# Patient Record
Sex: FEMALE | Race: WHITE | Employment: FULL TIME | URBAN - METROPOLITAN AREA
[De-identification: names, ages, dates, MRNs, and addresses within clinical notes are randomized per-mention and may not be internally consistent; named-entity substitution may affect disease eponyms.]

---

## 2017-03-18 PROBLEM — R82.71 GBS BACTERIURIA: Status: ACTIVE | Noted: 2017-03-18

## 2017-03-18 PROBLEM — E05.90 SUBCLINICAL HYPERTHYROIDISM: Status: ACTIVE | Noted: 2017-03-18

## 2017-03-21 ENCOUNTER — HOSPITAL ENCOUNTER (OUTPATIENT)
Dept: NON INVASIVE DIAGNOSTICS | Age: 27
Discharge: HOME | End: 2017-03-21
Attending: OBSTETRICS & GYNECOLOGY
Payer: COMMERCIAL

## 2017-03-21 DIAGNOSIS — I10 ESSENTIAL HYPERTENSION: ICD-10-CM

## 2017-03-21 DIAGNOSIS — Z3A.01 7 WEEKS GESTATION OF PREGNANCY: ICD-10-CM

## 2017-03-21 LAB
ATRIAL RATE: 80 BPM
CALCULATED P AXIS, ECG09: 24 DEGREES
CALCULATED R AXIS, ECG10: 82 DEGREES
CALCULATED T AXIS, ECG11: 55 DEGREES
DIAGNOSIS, 93000: NORMAL
DIASTOLIC BP, ECG02: NORMAL MMHG
P-R INTERVAL, ECG05: 132 MS
Q-T INTERVAL, ECG07: 372 MS
QRS DURATION, ECG06: 84 MS
QTC CALCULATION (BEZET), ECG08: 429 MS
SYSTOLIC BP, ECG01: NORMAL MMHG
VENTRICULAR RATE, ECG03: 80 BPM

## 2017-03-21 PROCEDURE — 93005 ELECTROCARDIOGRAM TRACING: CPT

## 2017-05-19 PROBLEM — O09.90 SUPERVISION OF HIGH RISK PREGNANCY, ANTEPARTUM: Status: ACTIVE | Noted: 2017-05-19

## 2017-06-03 ENCOUNTER — HOSPITAL ENCOUNTER (EMERGENCY)
Age: 27
Discharge: HOME | End: 2017-06-03
Attending: OBSTETRICS & GYNECOLOGY | Admitting: OBSTETRICS & GYNECOLOGY
Payer: COMMERCIAL

## 2017-06-03 VITALS
DIASTOLIC BLOOD PRESSURE: 82 MMHG | SYSTOLIC BLOOD PRESSURE: 161 MMHG | HEART RATE: 69 BPM | TEMPERATURE: 98.3 F | WEIGHT: 159 LBS | BODY MASS INDEX: 28.17 KG/M2 | HEIGHT: 63 IN | RESPIRATION RATE: 20 BRPM

## 2017-06-03 PROCEDURE — 99283 EMERGENCY DEPT VISIT LOW MDM: CPT

## 2017-06-03 NOTE — PROGRESS NOTES
Pt to room OBER1 for triage with chief complaint of Back Pain with pedal numbness. . Assessment begins, EFM and [place] applied and tracing well.

## 2017-06-03 NOTE — IP AVS SNAPSHOT
Myles Hernández 
 
 
 [address] [id number] [facility] 
[phone] Patient: Glenn Gordon MRN: [id number] :1990 You are allergic to the following No active allergies Recent Documentation Height Weight BMI OB Status Smoking Status 1.6 m 72.1 kg 28.17 kg/m2 Pregnant Former Smoker Emergency Contacts Name Discharge Info Relation Home Work Mobile [address] [facility] CAREGIVER [3] Spouse [3] 21 [phone] Lida Rice  Mother [14] [phone] About your hospitalization You were admitted on:  N/A You last received care in the:  [facility] You were discharged on:  Jeni 3, 2017 Unit phone number:  [phone] Why you were hospitalized Your primary diagnosis was:  Not on File Providers Seen During Your Hospitalizations Provider Role Specialty Primary office phone Helen Cronin MD Attending Provider Obstetrics & Gynecology [phone] Your Primary Care Physician (PCP) Primary Care Physician Office Phone Office Fax Lynda Lennon [phone] Follow-up Information None Your Appointments 2017  9:00 AM EDT Ultrasound plus physician visit with Lulú Hernandez 89 [facility] [address] [address] [phone] 2017  9:45 AM EDT Ultrasound plus physician visit with MD Molly Dawson ([facility]) [address] [address] [phone] Current Discharge Medication List  
  
ASK your doctor about these medications Dose & Instructions Dispensing Information Comments Morning Noon Evening Bedtime  
 aspirin delayed-release 81 mg tablet Your last dose was: Your next dose is: Dose:  81 mg Take 1 Tab by mouth daily. Quantity:  30 Tab Refills:  11  
     
   
   
   
  
 doxylamine-pyridoxine 10-10 mg Tbec Commonly known as:  Flordia Pellant Your last dose was: Your next dose is:    
   
   
 Dose:  2 Tab Take 2 Tabs by mouth nightly. May add 1 tab in morning and 1 tab at noon if needed Quantity:  120 Tab Refills:  5  
     
   
   
   
  
 labetalol 200 mg tablet Commonly known as:  Jerry Dowse Your last dose was: Your next dose is:    
   
   
 Dose:  400 mg Take 2 Tabs by mouth two (2) times a day. Indications: hypertension Quantity:  120 Tab Refills:  5 -iron-FA-om-3s-dha-epa 3.33 mg iron- 0.33 mg Rajni Rumble Commonly known as:  Emiliano Oas Your last dose was: Your next dose is:    
   
   
 Dose:  3 Tab Take 3 Tabs by mouth daily. Quantity:  90 Tab Refills:  11 Discharge Instructions Weeks 18 to 22 of Your Pregnancy: Care Instructions Your Care Instructions Your baby is continuing to develop quickly. At this stage, babies can now suck their thumbs,  firmly with their hands, and open and close their eyelids. Sometime between 18 and 22 weeks, you will start to feel your baby move. At first, these small fetal movements feel like fluttering or \"butterflies. \" Some women say that they feel like gas bubbles. As the baby grows, these movements will become stronger. You may also notice that your baby kicks and hiccups. During this time, you may find that your nausea and fatigue are gone. Overall, you may feel better and have more energy than you did in your first trimester. But you may also have new discomforts now, such as sleep problems or leg cramps. This care sheet can help you ease these discomforts. Follow-up care is a key part of your treatment and safety.  Be sure to make and go to all appointments, and call your doctor if you are having problems. It's also a good idea to know your test results and keep a list of the medicines you take. How can you care for yourself at home? Ease sleep problems · Avoid caffeine in drinks or chocolate late in the day. · Get some exercise every day. · Take a warm shower or bath before bed. · Have a light snack or glass of milk at bedtime. · Do relaxation exercises in bed to calm your mind and body. · Support your legs and back with extra pillows. Try a pillow between your legs if you sleep on your side. · Do not use sleeping pills or alcohol. They could harm your baby. Ease leg cramps · Do not massage your calf during the cramp. · Sit on a firm bed or chair. Straighten your leg, and bend your foot (flex your ankle) slowly upward, toward your knee. Bend your toes up and down. · Stand on a cool, flat surface. Stretch your toes upward, and take small steps walking on your heels. · Use a heating pad or hot water bottle to help with muscle ache. Prevent leg cramps · Be sure to get enough calcium. If you are worried that you are not getting enough, talk to your doctor. · Exercise every day, and stretch your legs before bed. · Take a warm bath before bed, and try leg warmers at night. Back Pain: Care Instructions Your Care Instructions Back pain has many possible causes. It is often related to problems with muscles and ligaments of the back. It may also be related to problems with the nerves, discs, or bones of the back. Moving, lifting, standing, sitting, or sleeping in an awkward way can strain the back. Sometimes you don't notice the injury until later. Arthritis is another common cause of back pain. Although it may hurt a lot, back pain usually improves on its own within several weeks. Most people recover in 12 weeks or less. Using good home treatment and being careful not to stress your back can help you feel better sooner. Follow-up care is a key part of your treatment and safety. Be sure to make and go to all appointments, and call your doctor if you are having problems. Its also a good idea to know your test results and keep a list of the medicines you take. How can you care for yourself at home? Sit or lie in positions that are most comfortable and reduce your pain. Try one of these positions when you lie down: Lie on your back with your knees bent and supported by large pillows. Lie on the floor with your legs on the seat of a sofa or chair. Lie on your side with your knees and hips bent and a pillow between your legs. Lie on your stomach if it does not make pain worse. Do not sit up in bed, and avoid soft couches and twisted positions. Bed rest can help relieve pain at first, but it delays healing. Avoid bed rest after the first day of back pain. Change positions every 30 minutes. If you must sit for long periods of time, take breaks from sitting. Get up and walk around, or lie in a comfortable position. Try using a heating pad on a low or medium setting for 15 to 20 minutes every 2 or 3 hours. Try a warm shower in place of one session with the heating pad. You can also try an ice pack for 10 to 15 minutes every 2 to 3 hours. Put a thin cloth between the ice pack and your skin. Take pain medicines exactly as directed. If the doctor gave you a prescription medicine for pain, take it as prescribed. If you are not taking a prescription pain medicine, ask your doctor if you can take an over-the-counter medicine. Take short walks several times a day. You can start with 5 to 10 minutes, 3 or 4 times a day, and work up to longer walks. Walk on level surfaces and avoid hills and stairs until your back is better. Return to work and other activities as soon as you can. Continued rest without activity is usually not good for your back.  
To prevent future back pain, do exercises to stretch and strengthen your back and stomach. Learn how to use good posture, safe lifting techniques, and proper body mechanics. When should you call for help? Call your doctor now or seek immediate medical care if: 
You have new or worsening numbness in your legs. You have new or worsening weakness in your legs. (This could make it hard to stand up.) You lose control of your bladder or bowels. Watch closely for changes in your health, and be sure to contact your doctor if: 
Your pain gets worse. You are not getting better after 2 weeks. Where can you learn more? Go to http://Pyxis Technology/. Enter J951 in the search box to learn more about \"Back Pain: Care Instructions. \" Current as of: May 23, 2016 Content Version: 11.2 © 0679-8485 [employer]. Care instructions adapted under license by Laser[employer] (which disclaims liability or warranty for this information). If you have questions about a medical condition or this instruction, always ask your healthcare professional. [address] any warranty or liability for your use of this information. · Where can you learn more? Go to http://Pyxis Technology/. Enter W477 in the search box to learn more about \"Weeks 18 to 22 of Your Pregnancy: Care Instructions. \" Current as of: May 30, 2016 Content Version: 11.2 © 5784-4387 [employer]. Care instructions adapted under license by [employer] (which disclaims liability or warranty for this information). If you have questions about a medical condition or this instruction, always ask your healthcare professional. [address] any warranty or liability for your use of this information. Discharge Orders None Introducing [facility]! Dear [facility]: Thank you for requesting a [employer] account. Our records indicate that you already have an active [employer] account.   You can access your account anytime at [email or web address] Systems/Step On Up Graphic[employer] Did you know that you can access your hospital and ER discharge instructions at any time in [employer]? You can also review all of your test results from your hospital stay or ER visit. Additional Information If you have questions, please visit the Frequently Asked Questions section of the [employer] website at [email or web address] [email or web address] Remember, [employer] is NOT to be used for urgent needs. For medical emergencies, dial 911. Now available from your iPhone and Android! General Information Please provide this summary of care documentation to your next provider. Patient Signature:  ____________________________________________________________ Date:  ____________________________________________________________  
  
Renard Neil Provider Signature:  ____________________________________________________________ Date:  ____________________________________________________________

## 2017-06-03 NOTE — IP AVS SNAPSHOT
Current Discharge Medication List  
  
ASK your doctor about these medications Dose & Instructions Dispensing Information Comments Morning Noon Evening Bedtime  
 aspirin delayed-release 81 mg tablet Your last dose was: Your next dose is:    
   
   
 Dose:  81 mg Take 1 Tab by mouth daily. Quantity:  30 Tab Refills:  11  
     
   
   
   
  
 doxylamine-pyridoxine 10-10 mg Tbec Commonly known as:  Melian Castaneda Your last dose was: Your next dose is:    
   
   
 Dose:  2 Tab Take 2 Tabs by mouth nightly. May add 1 tab in morning and 1 tab at noon if needed Quantity:  120 Tab Refills:  5  
     
   
   
   
  
 labetalol 200 mg tablet Commonly known as:  Robin Wilson Your last dose was: Your next dose is:    
   
   
 Dose:  400 mg Take 2 Tabs by mouth two (2) times a day. Indications: hypertension Quantity:  120 Tab Refills:  5 -iron-FA-om-3s-dha-epa 3.33 mg iron- 0.33 mg Erick Jackson Commonly known as:  Juan Eugene Your last dose was: Your next dose is:    
   
   
 Dose:  3 Tab Take 3 Tabs by mouth daily. Quantity:  90 Tab Refills:  11

## 2017-06-03 NOTE — DISCHARGE INSTRUCTIONS
Weeks 18 to 22 of Your Pregnancy: Care Instructions  Your Care Instructions    Your baby is continuing to develop quickly. At this stage, babies can now suck their thumbs,  firmly with their hands, and open and close their eyelids. Sometime between 18 and 22 weeks, you will start to feel your baby move. At first, these small fetal movements feel like fluttering or \"butterflies. \" Some women say that they feel like gas bubbles. As the baby grows, these movements will become stronger. You may also notice that your baby kicks and hiccups. During this time, you may find that your nausea and fatigue are gone. Overall, you may feel better and have more energy than you did in your first trimester. But you may also have new discomforts now, such as sleep problems or leg cramps. This care sheet can help you ease these discomforts. Follow-up care is a key part of your treatment and safety. Be sure to make and go to all appointments, and call your doctor if you are having problems. It's also a good idea to know your test results and keep a list of the medicines you take. How can you care for yourself at home? Ease sleep problems  · Avoid caffeine in drinks or chocolate late in the day. · Get some exercise every day. · Take a warm shower or bath before bed. · Have a light snack or glass of milk at bedtime. · Do relaxation exercises in bed to calm your mind and body. · Support your legs and back with extra pillows. Try a pillow between your legs if you sleep on your side. · Do not use sleeping pills or alcohol. They could harm your baby. Ease leg cramps  · Do not massage your calf during the cramp. · Sit on a firm bed or chair. Straighten your leg, and bend your foot (flex your ankle) slowly upward, toward your knee. Bend your toes up and down. · Stand on a cool, flat surface. Stretch your toes upward, and take small steps walking on your heels.   · Use a heating pad or hot water bottle to help with muscle ache.  Prevent leg cramps  · Be sure to get enough calcium. If you are worried that you are not getting enough, talk to your doctor. · Exercise every day, and stretch your legs before bed. · Take a warm bath before bed, and try leg warmers at night. Back Pain: Care Instructions  Your Care Instructions    Back pain has many possible causes. It is often related to problems with muscles and ligaments of the back. It may also be related to problems with the nerves, discs, or bones of the back. Moving, lifting, standing, sitting, or sleeping in an awkward way can strain the back. Sometimes you don't notice the injury until later. Arthritis is another common cause of back pain. Although it may hurt a lot, back pain usually improves on its own within several weeks. Most people recover in 12 weeks or less. Using good home treatment and being careful not to stress your back can help you feel better sooner. Follow-up care is a key part of your treatment and safety. Be sure to make and go to all appointments, and call your doctor if you are having problems. Its also a good idea to know your test results and keep a list of the medicines you take. How can you care for yourself at home? Sit or lie in positions that are most comfortable and reduce your pain. Try one of these positions when you lie down:  Lie on your back with your knees bent and supported by large pillows. Lie on the floor with your legs on the seat of a sofa or chair. Lie on your side with your knees and hips bent and a pillow between your legs. Lie on your stomach if it does not make pain worse. Do not sit up in bed, and avoid soft couches and twisted positions. Bed rest can help relieve pain at first, but it delays healing. Avoid bed rest after the first day of back pain. Change positions every 30 minutes. If you must sit for long periods of time, take breaks from sitting. Get up and walk around, or lie in a comfortable position.   Try using a heating pad on a low or medium setting for 15 to 20 minutes every 2 or 3 hours. Try a warm shower in place of one session with the heating pad. You can also try an ice pack for 10 to 15 minutes every 2 to 3 hours. Put a thin cloth between the ice pack and your skin. Take pain medicines exactly as directed. If the doctor gave you a prescription medicine for pain, take it as prescribed. If you are not taking a prescription pain medicine, ask your doctor if you can take an over-the-counter medicine. Take short walks several times a day. You can start with 5 to 10 minutes, 3 or 4 times a day, and work up to longer walks. Walk on level surfaces and avoid hills and stairs until your back is better. Return to work and other activities as soon as you can. Continued rest without activity is usually not good for your back. To prevent future back pain, do exercises to stretch and strengthen your back and stomach. Learn how to use good posture, safe lifting techniques, and proper body mechanics. When should you call for help? Call your doctor now or seek immediate medical care if:  You have new or worsening numbness in your legs. You have new or worsening weakness in your legs. (This could make it hard to stand up.)  You lose control of your bladder or bowels. Watch closely for changes in your health, and be sure to contact your doctor if:  Your pain gets worse. You are not getting better after 2 weeks. Where can you learn more? Go to http://micha-mahi.info/. Enter P828 in the search box to learn more about \"Back Pain: Care Instructions. \"  Current as of: May 23, 2016  Content Version: 11.2  © 7197-0245 [employer]. Care instructions adapted under license by Basic-[employer] (which disclaims liability or warranty for this information).  If you have questions about a medical condition or this instruction, always ask your healthcare professional. [employer] disclaims any warranty or liability for your use of this information. ·   Where can you learn more? Go to http://micha-mahi.info/. Enter H312 in the search box to learn more about \"Weeks 18 to 22 of Your Pregnancy: Care Instructions. \"  Current as of: May 30, 2016  Content Version: 11.2  © 3552-5778 [employer]. Care instructions adapted under license by [employer] (which disclaims liability or warranty for this information). If you have questions about a medical condition or this instruction, always ask your healthcare professional. [address] [address] any warranty or liability for your use of this information.

## 2017-06-03 NOTE — PROGRESS NOTES
06/03/17 1718   Cervical Exam   Dilation (cm) 0   Eff 0 %   SVE per Dr Jonah De. Talked to pt about talking to [facility] about PT, and she could maybe start wearing a maternity belt. Talked to pt about pedal numbness . .explaned may be due to edema.

## 2017-06-03 NOTE — H&P
Chief Complaint   Patient presents with    Back Pain       27 y.o. female at 19w2d  weeks gestation who is seen for sharp intermittent low back pain. Somewhat aggravated by activity and relieved by rest. Occasionally feels it radiates to lower abdomen. No vaginal bleeding, new discharge, new urinary frequency or urgency. 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 gbs positive urine earlier in pregnancy. HISTORY:    History   Sexual Activity    Sexual activity: Yes    Partners: Male    Birth control/ protection: None     Patient's last menstrual period was 01/19/2017. Social History     Social History    Marital status:      Spouse name: N/A    Number of children: N/A    Years of education: N/A     Occupational History    Not on file. Social History Main Topics    Smoking status: Former Smoker     Packs/day: 0.20     Years: 2.00    Smokeless tobacco: Current User      Comment: vapes occ     Alcohol use No    Drug use: No    Sexual activity: Yes     Partners: Male     Birth control/ protection: None     Other Topics Concern    Not on file     Social History Narrative       History reviewed. No pertinent surgical history. Past Medical History:   Diagnosis Date    Allergic rhinitis 1/17/2014    Depression 1/17/2014    HTN (hypertension) 1/17/2014    Nicotine vapor product user     Unspecified hypothyroidism 1/17/2014         ROS:  Negative:   negative 10 point ROS except as noted in HPI    Positive:   per hpi    PHYSICAL EXAM:  Blood pressure 161/82, pulse 69, temperature 98.3 °F (36.8 °C), resp. rate 20, height 5' 3\" (1.6 m), weight 72.1 kg (159 lb), last menstrual period 01/19/2017. The patient appears well, alert, oriented x 3. Appropriate affect. Lungs are clear. Heart RRR, no murmurs. Abdomen soft, non-tender, no rebound/guarding.   Fundus soft and non tender  Skin warm, dry, no rashes  Ext  No edema, DTR's normal. ttp over both SI joints    Cervix: Dilation: 0 cm, Effacement: [address]: -3.    Fetal Heart Rate: present      No results found for this or any previous visit (from the past 24 hour(s)). Assessment:  27 y.o. female at 25w3d. with likely sacroilitis. Plan: discussed mgmt with tylenol, heat, stretches, and exercises. Could talk to her primary about PT. Recommend maternity support band. Charleen Dougherty MD

## 2017-06-14 ENCOUNTER — HOSPITAL ENCOUNTER (EMERGENCY)
Age: 27
Discharge: HOME | End: 2017-06-14
Attending: OBSTETRICS & GYNECOLOGY | Admitting: OBSTETRICS & GYNECOLOGY
Payer: COMMERCIAL

## 2017-06-14 VITALS
TEMPERATURE: 98.3 F | HEART RATE: 57 BPM | SYSTOLIC BLOOD PRESSURE: 152 MMHG | DIASTOLIC BLOOD PRESSURE: 81 MMHG | RESPIRATION RATE: 20 BRPM

## 2017-06-14 LAB
ALBUMIN SERPL BCP-MCNC: 3.1 G/DL (ref 3.5–5)
ALBUMIN/GLOB SERPL: 0.9 {RATIO} (ref 1.2–3.5)
ALP SERPL-CCNC: 53 U/L (ref 50–136)
ALT SERPL-CCNC: 16 U/L (ref 12–65)
ANION GAP BLD CALC-SCNC: 10 MMOL/L (ref 7–16)
APPEARANCE UR: ABNORMAL
AST SERPL W P-5'-P-CCNC: 15 U/L (ref 15–37)
BACTERIA URNS QL MICRO: ABNORMAL /HPF
BASOPHILS # BLD AUTO: 0 K/UL (ref 0–0.2)
BASOPHILS # BLD: 0 % (ref 0–2)
BILIRUB SERPL-MCNC: 0.4 MG/DL (ref 0.2–1.1)
BILIRUB UR QL: NEGATIVE
BUN SERPL-MCNC: 6 MG/DL (ref 6–23)
CALCIUM SERPL-MCNC: 9 MG/DL (ref 8.3–10.4)
CHLORIDE SERPL-SCNC: 106 MMOL/L (ref 98–107)
CO2 SERPL-SCNC: 25 MMOL/L (ref 21–32)
COLOR UR: YELLOW
CREAT SERPL-MCNC: 0.56 MG/DL (ref 0.6–1)
DIFFERENTIAL METHOD BLD: ABNORMAL
EOSINOPHIL # BLD: 0.1 K/UL (ref 0–0.8)
EOSINOPHIL NFR BLD: 1 % (ref 0.5–7.8)
EPI CELLS #/AREA URNS HPF: ABNORMAL /HPF
ERYTHROCYTE [DISTWIDTH] IN BLOOD BY AUTOMATED COUNT: 12.6 % (ref 11.9–14.6)
GLOBULIN SER CALC-MCNC: 3.4 G/DL (ref 2.3–3.5)
GLUCOSE SERPL-MCNC: 88 MG/DL (ref 65–100)
GLUCOSE UR STRIP.AUTO-MCNC: NEGATIVE MG/DL
HCT VFR BLD AUTO: 34 % (ref 35.8–46.3)
HGB BLD-MCNC: 11.8 G/DL (ref 11.7–15.4)
HGB UR QL STRIP: NEGATIVE
IMM GRANULOCYTES # BLD: 0 K/UL (ref 0–0.5)
IMM GRANULOCYTES NFR BLD AUTO: 0.3 % (ref 0–5)
KETONES UR QL STRIP.AUTO: NEGATIVE MG/DL
LEUKOCYTE ESTERASE UR QL STRIP.AUTO: ABNORMAL
LYMPHOCYTES # BLD AUTO: 18 % (ref 13–44)
LYMPHOCYTES # BLD: 1.8 K/UL (ref 0.5–4.6)
MCH RBC QN AUTO: 29.9 PG (ref 26.1–32.9)
MCHC RBC AUTO-ENTMCNC: 34.7 G/DL (ref 31.4–35)
MCV RBC AUTO: 86.3 FL (ref 79.6–97.8)
MONOCYTES # BLD: 0.8 K/UL (ref 0.1–1.3)
MONOCYTES NFR BLD AUTO: 7 % (ref 4–12)
NEUTS SEG # BLD: 7.6 K/UL (ref 1.7–8.2)
NEUTS SEG NFR BLD AUTO: 74 % (ref 43–78)
NITRITE UR QL STRIP.AUTO: NEGATIVE
OTHER OBSERVATIONS,UCOM: ABNORMAL
PH UR STRIP: 7 [PH] (ref 5–9)
PLATELET # BLD AUTO: 250 K/UL (ref 150–450)
PMV BLD AUTO: 10.7 FL (ref 10.8–14.1)
POTASSIUM SERPL-SCNC: 3.3 MMOL/L (ref 3.5–5.1)
PROT SERPL-MCNC: 6.5 G/DL (ref 6.3–8.2)
PROT UR STRIP-MCNC: NEGATIVE MG/DL
RBC # BLD AUTO: 3.94 M/UL (ref 4.05–5.25)
RBC #/AREA URNS HPF: ABNORMAL /HPF
SODIUM SERPL-SCNC: 141 MMOL/L (ref 136–145)
SP GR UR REFRACTOMETRY: 1.01 (ref 1–1.02)
UROBILINOGEN UR QL STRIP.AUTO: 0.2 EU/DL (ref 0.2–1)
WBC # BLD AUTO: 10.4 K/UL (ref 4.3–11.1)
WBC URNS QL MICRO: ABNORMAL /HPF

## 2017-06-14 PROCEDURE — 96374 THER/PROPH/DIAG INJ IV PUSH: CPT

## 2017-06-14 PROCEDURE — 80053 COMPREHEN METABOLIC PANEL: CPT | Performed by: OBSTETRICS & GYNECOLOGY

## 2017-06-14 PROCEDURE — 99283 EMERGENCY DEPT VISIT LOW MDM: CPT

## 2017-06-14 PROCEDURE — 81001 URINALYSIS AUTO W/SCOPE: CPT | Performed by: OBSTETRICS & GYNECOLOGY

## 2017-06-14 PROCEDURE — 74011250636 HC RX REV CODE- 250/636: Performed by: OBSTETRICS & GYNECOLOGY

## 2017-06-14 PROCEDURE — 96360 HYDRATION IV INFUSION INIT: CPT

## 2017-06-14 PROCEDURE — 85025 COMPLETE CBC W/AUTO DIFF WBC: CPT | Performed by: OBSTETRICS & GYNECOLOGY

## 2017-06-14 PROCEDURE — 96375 TX/PRO/DX INJ NEW DRUG ADDON: CPT

## 2017-06-14 RX ORDER — ONDANSETRON 4 MG/1
4 TABLET, ORALLY DISINTEGRATING ORAL
Qty: 10 TAB | Refills: 0 | Status: ON HOLD | OUTPATIENT
Start: 2017-06-14 | End: 2017-07-18

## 2017-06-14 RX ORDER — ONDANSETRON 2 MG/ML
4 INJECTION INTRAMUSCULAR; INTRAVENOUS ONCE
Status: COMPLETED | OUTPATIENT
Start: 2017-06-14 | End: 2017-06-14

## 2017-06-14 RX ORDER — SODIUM CHLORIDE, SODIUM LACTATE, POTASSIUM CHLORIDE, CALCIUM CHLORIDE 600; 310; 30; 20 MG/100ML; MG/100ML; MG/100ML; MG/100ML
500 INJECTION, SOLUTION INTRAVENOUS CONTINUOUS
Status: DISCONTINUED | OUTPATIENT
Start: 2017-06-14 | End: 2017-06-14 | Stop reason: HOSPADM

## 2017-06-14 RX ADMIN — ONDANSETRON 4 MG: 2 INJECTION INTRAMUSCULAR; INTRAVENOUS at 19:19

## 2017-06-14 RX ADMIN — SODIUM CHLORIDE, SODIUM LACTATE, POTASSIUM CHLORIDE, AND CALCIUM CHLORIDE 500 ML/HR: 600; 310; 30; 20 INJECTION, SOLUTION INTRAVENOUS at 19:10

## 2017-06-14 NOTE — PROGRESS NOTES
[address] June 14, 2017       RE: Prashant Downey      To Whom It May Concern,    This is to certify that Prashant Downey may return to work on 6/16/17. Please feel free to contact my office if you have any questions or concerns. Thank you for your assistance in this matter.       Sincerely,  Delton Nageotte, RN

## 2017-06-14 NOTE — IP AVS SNAPSHOT
Summary of Care Report The Summary of Care report has been created to help improve care coordination. Users with access to [employer] or [employer] [address] (Web-based application) may access additional patient information including the Discharge Summary. If you are not currently a [address] user and need more information, please call the number listed below in the [place] [address] section and ask to be connected with Medical Records. Facility Information Name Address Phone [id number] [place] [address] [address] [place] [address] [phone] Patient Information Patient Name Sex SHAMA Barry ([id number]) Female 1990 Discharge Information Admitting Provider Service Area Unit Jose Alvares MD / [address] [facility] 4 Antepartum / [phone] Discharge Provider Discharge Date/Time Discharge Disposition Destination (none) (none) (none) (none) Patient Language Language ENGLISH [13] Hospital Problems as of 2017  Reviewed: 6/3/2017  5:11 PM by Sherie Retana MD  
 None Non-Hospital Problems as of 2017  Reviewed: 6/3/2017  5:11 PM by Sherie Retana MD  
  
  
  
 Class Noted - Resolved Last Modified Active Problems HTN (hypertension)  2014 - Present 2017 by Robyn Zuleta MD  
  Entered by [facility] Overview Addendum 2017 10:10 AM by Robyn Zuleta MD  
   Labetalol 200mg BID on intake Dx at age 16yo; reports renal imaging neg Will need serial growth US and  testing by 34wks Baseline HELLP labs wnl Baseline 24hr urine:  90 (3/29/17) Baseline EKG wnl (3/21/17) ASA 81mg -->DC 36wks 5/15/17 (15wks) Increased to Lab 400 BID  but reports this made her nauseous so she changed to taking 200, waiting a few hours and taking the next 200mg 6/7/17: changed to Labetalol 200 TID Allergic rhinitis  1/17/2014 - Present 1/17/2014 by Darral Olszewski Entered by Darral Olszewski  
  Depression  1/17/2014 - Present 1/17/2014 by Darral Olszewski Entered by Darral Olszewski Dysmenorrhea  4/5/2016 - Present 4/5/2016 by Lg Laura Entered by Lg Laura Amenorrhea  4/5/2016 - Present 4/5/2016 by Lg Laura Entered by Lg Laura Hyperprolactinemia ([address]  4/14/2016 - Present 4/14/2016 by Nancie Cortez MD  
  Entered by Nancie Cortez MD  
  Subclinical hyperthyroidism  3/18/2017 - Present 3/18/2017 by Michael Lu MD  
  Entered by Michael Lu MD  
  Overview Signed 3/18/2017  3:30 PM by Michael Lu MD  
   No meds required GBS bacteriuria  3/18/2017 - Present 4/12/2017 by Michael Lu MD  
  Entered by Michael Lu MD  
  Overview Addendum 4/12/2017  8:14 AM by Michael Lu MD  
   +GBS UTI-->Rx for PCN (3/18); neg JESSICA 4/10/17 If gets 2nd UTI will need ppx for rest of pregnancy 
will need ppx in labor as well Supervision of high risk pregnancy  5/19/2017 - Present 5/19/2017 by Michael Lu MD  
  Entered by Michael Lu MD  
  Overview Signed 5/19/2017  9:08 AM by Michael Lu MD  
   Quad screen neg You are allergic to the following No active allergies Current Discharge Medication List  
  
ASK your doctor about these medications Dose & Instructions Dispensing Information Comments  
 aspirin delayed-release 81 mg tablet Dose:  81 mg Take 1 Tab by mouth daily. Quantity:  30 Tab Refills:  11  
   
 doxylamine-pyridoxine 10-10 mg Tbec Commonly known as:  Lionel Peck Dose:  2 Tab Take 2 Tabs by mouth nightly. May add 1 tab in morning and 1 tab at noon if needed Quantity:  120 Tab Refills:  5  
   
 labetalol 200 mg tablet Commonly known as:  Mendel Bevels  Dose:  200 mg  
 Take 1 Tab by mouth three (3) times daily. Indications: hypertension Quantity:  90 Tab Refills:  5 -iron-FA-om-3s-dha-epa 3.33 mg iron- 0.33 mg Ely Plana Commonly known as:  See Leahy Dose:  3 Tab Take 3 Tabs by mouth daily. Quantity:  90 Tab Refills:  11 Follow-up Information None Discharge Instructions Weeks 18 to 22 of Your Pregnancy: Care Instructions Your Care Instructions Your baby is continuing to develop quickly. At this stage, babies can now suck their thumbs,  firmly with their hands, and open and close their eyelids. Sometime between 18 and 22 weeks, you will start to feel your baby move. At first, these small fetal movements feel like fluttering or \"butterflies. \" Some women say that they feel like gas bubbles. As the baby grows, these movements will become stronger. You may also notice that your baby kicks and hiccups. During this time, you may find that your nausea and fatigue are gone. Overall, you may feel better and have more energy than you did in your first trimester. But you may also have new discomforts now, such as sleep problems or leg cramps. This care sheet can help you ease these discomforts. Follow-up care is a key part of your treatment and safety. Be sure to make and go to all appointments, and call your doctor if you are having problems. It's also a good idea to know your test results and keep a list of the medicines you take. How can you care for yourself at home? Ease sleep problems · Avoid caffeine in drinks or chocolate late in the day. · Get some exercise every day. · Take a warm shower or bath before bed. · Have a light snack or glass of milk at bedtime. · Do relaxation exercises in bed to calm your mind and body. · Support your legs and back with extra pillows. Try a pillow between your legs if you sleep on your side. · Do not use sleeping pills or alcohol. They could harm your baby. Ease leg cramps · Do not massage your calf during the cramp. · Sit on a firm bed or chair. Straighten your leg, and bend your foot (flex your ankle) slowly upward, toward your knee. Bend your toes up and down. · Stand on a cool, flat surface. Stretch your toes upward, and take small steps walking on your heels. · Use a heating pad or hot water bottle to help with muscle ache. Prevent leg cramps · Be sure to get enough calcium. If you are worried that you are not getting enough, talk to your doctor. · Exercise every day, and stretch your legs before bed. · Take a warm bath before bed, and try leg warmers at night. Where can you learn more? Go to http://micha-mahi.info/. Enter I266 in the search box to learn more about \"Weeks 18 to 22 of Your Pregnancy: Care Instructions. \" Current as of: May 30, 2016 Content Version: 11.2 © 9637-4824 [employer]. Care instructions adapted under license by [employer] (which disclaims liability or warranty for this information). If you have questions about a medical condition or this instruction, always ask your healthcare professional. [place] [address] any warranty or liability for your use of this information. Pregnancy Precautions: Care Instructions Your Care Instructions There is no sure way to prevent labor before your due date ( labor) or to prevent most other pregnancy problems. But there are things you can do to increase your chances of a healthy pregnancy. Go to your appointments, follow your doctor's advice, and take good care of yourself. Eat well, and exercise (if your doctor agrees). And make sure to drink plenty of water. Follow-up care is a key part of your treatment and safety. Be sure to make and go to all appointments, and call your doctor if you are having problems. It's also a good idea to know your test results and keep a list of the medicines you take. How can you care for yourself at home? · Make sure you go to your prenatal appointments. At each visit, your doctor will check your blood pressure. Your doctor will also check to see if you have protein in your urine. High blood pressure and protein in urine are signs of preeclampsia. This condition can be dangerous for you and your baby. · Drink plenty of fluids, enough so that your urine is light yellow or clear like water. Dehydration can cause contractions. If you have kidney, heart, or liver disease and have to limit fluids, talk with your doctor before you increase the amount of fluids you drink. · Tell your doctor right away if you notice any symptoms of an infection, such as: ¨ Burning when you urinate. ¨ A foul-smelling discharge from your vagina. ¨ Vaginal itching. ¨ Unexplained fever. ¨ Unusual pain or soreness in your uterus or lower belly. · Eat a balanced diet. Include plenty of foods that are high in calcium and iron. ¨ Foods high in calcium include milk, cheese, yogurt, almonds, and broccoli. ¨ Foods high in iron include red meat, shellfish, poultry, eggs, beans, raisins, whole-grain bread, and leafy green vegetables. · Do not smoke. If you need help quitting, talk to your doctor about stop-smoking programs and medicines. These can increase your chances of quitting for good. · Do not drink alcohol or use illegal drugs. · Follow your doctor's directions about activity. Your doctor will let you know how much, if any, exercise you can do. · Ask your doctor if you can have sex. If you are at risk for early labor, your doctor may ask you to not have sex. · Take care to prevent falls. During pregnancy, your joints are loose, and your balance is off. Sports such as bicycling, skiing, or in-line skating can increase your risk of falling. And don't ride horses or motorcycles, dive, water ski, scuba dive, or parachute jump while you are pregnant. · Avoid getting very hot. Do not use saunas or hot tubs. Avoid staying out in the sun in hot weather for long periods. Take acetaminophen (Tylenol) to lower a high fever. · Do not take any over-the-counter or herbal medicines or supplements without talking to your doctor or pharmacist first. 
When should you call for help? Call 911 anytime you think you may need emergency care. For example, call if: 
· You passed out (lost consciousness). · You have severe vaginal bleeding. · You have severe pain in your belly or pelvis. · You have had fluid gushing or leaking from your vagina and you know or think the umbilical cord is bulging into your vagina. If this happens, immediately get down on your knees so your rear end (buttocks) is higher than your head. This will decrease the pressure on the cord until help arrives. Call your doctor now or seek immediate medical care if: 
· You have signs of preeclampsia, such as: 
¨ Sudden swelling of your face, hands, or feet. ¨ New vision problems (such as dimness or blurring). ¨ A severe headache. · You have any vaginal bleeding. · You have belly pain or cramping. · You have a fever. · You have had regular contractions (with or without pain) for an hour. This means that you have 8 or more within 1 hour or 4 or more in 20 minutes after you change your position and drink fluids. · You have a sudden release of fluid from your vagina. · You have low back pain or pelvic pressure that does not go away. · You notice that your baby has stopped moving or is moving much less than normal. 
Watch closely for changes in your health, and be sure to contact your doctor if you have any problems. Where can you learn more? Go to http://micha-mahi.info/. Enter [id number] in the search box to learn more about \"Pregnancy Precautions: Care Instructions. \" Current as of: May 30, 2016 Content Version: 11.2 © 7533-5196 [employer]. Care instructions adapted under license by [employer] (which disclaims liability or warranty for this information). If you have questions about a medical condition or this instruction, always ask your healthcare professional. [place] [address] any warranty or liability for your use of this information. Chart Review Routing History Recipient Method Report Sent By Crissie Sever Craven Amabile, MD  
Phone: [phone] In Basket Note Review Ninfa Reynoso MD [[id number]] 4/5/2016 11:49 AM 04/05/2016

## 2017-06-14 NOTE — IP AVS SNAPSHOT
[address] 
[phone] Patient: Glenn Gordon MRN: [id number] :1990 You are allergic to the following No active allergies Recent Documentation OB Status Smoking Status Pregnant Former Smoker Emergency Contacts Name Discharge Info Relation Home Work Mobile [facility] CAREGIVER [3] Spouse [3] 21 [phone] Lida Rice  Mother [14] [phone] About your hospitalization You were admitted on:  N/A You last received care in the:  [facility] 4 ANTEPARTUM You were discharged on:  2017 Unit phone number:  [phone] Why you were hospitalized Your primary diagnosis was:  Not on File Providers Seen During Your Hospitalizations Provider Role Specialty Primary office phone Helen Cronin MD Attending Provider Obstetrics & Gynecology [phone] Your Primary Care Physician (PCP) Primary Care Physician Office Phone Office Fax Lynda Farazalonso [phone] Follow-up Information None Your Appointments 2017  9:15 AM EDT Return OB with MD [facility] [address] [place] [id number] [phone] Current Discharge Medication List  
  
ASK your doctor about these medications Dose & Instructions Dispensing Information Comments Morning Noon Evening Bedtime  
 aspirin delayed-release 81 mg tablet Your last dose was: Your next dose is:    
   
   
 Dose:  81 mg Take 1 Tab by mouth daily. Quantity:  30 Tab Refills:  11  
     
   
   
   
  
 doxylamine-pyridoxine 10-10 mg Tbec Commonly known as:  Melina Castaneda Your last dose was: Your next dose is:    
   
   
 Dose:  2 Tab Take 2 Tabs by mouth nightly. May add 1 tab in morning and 1 tab at noon if needed Quantity:  120 Tab Refills:  5  
     
   
   
   
  
 labetalol 200 mg tablet Commonly known as:  Jake Schneider Your last dose was: Your next dose is:    
   
   
 Dose:  200 mg Take 1 Tab by mouth three (3) times daily. Indications: hypertension Quantity:  90 Tab Refills:  5 -iron-FA-om-3s-dha-epa 3.33 mg iron- 0.33 mg Najma Bjornstad Commonly known as:  Haseeb Stiles Your last dose was: Your next dose is:    
   
   
 Dose:  3 Tab Take 3 Tabs by mouth daily. Quantity:  90 Tab Refills:  11 Discharge Instructions Weeks 18 to 22 of Your Pregnancy: Care Instructions Your Care Instructions Your baby is continuing to develop quickly. At this stage, babies can now suck their thumbs,  firmly with their hands, and open and close their eyelids. Sometime between 18 and 22 weeks, you will start to feel your baby move. At first, these small fetal movements feel like fluttering or \"butterflies. \" Some women say that they feel like gas bubbles. As the baby grows, these movements will become stronger. You may also notice that your baby kicks and hiccups. During this time, you may find that your nausea and fatigue are gone. Overall, you may feel better and have more energy than you did in your first trimester. But you may also have new discomforts now, such as sleep problems or leg cramps. This care sheet can help you ease these discomforts. Follow-up care is a key part of your treatment and safety. Be sure to make and go to all appointments, and call your doctor if you are having problems. It's also a good idea to know your test results and keep a list of the medicines you take. How can you care for yourself at home? Ease sleep problems · Avoid caffeine in drinks or chocolate late in the day. · Get some exercise every day. · Take a warm shower or bath before bed. · Have a light snack or glass of milk at bedtime. · Do relaxation exercises in bed to calm your mind and body. · Support your legs and back with extra pillows. Try a pillow between your legs if you sleep on your side. · Do not use sleeping pills or alcohol. They could harm your baby. Ease leg cramps · Do not massage your calf during the cramp. · Sit on a firm bed or chair. Straighten your leg, and bend your foot (flex your ankle) slowly upward, toward your knee. Bend your toes up and down. · Stand on a cool, flat surface. Stretch your toes upward, and take small steps walking on your heels. · Use a heating pad or hot water bottle to help with muscle ache. Prevent leg cramps · Be sure to get enough calcium. If you are worried that you are not getting enough, talk to your doctor. · Exercise every day, and stretch your legs before bed. · Take a warm bath before bed, and try leg warmers at night. Where can you learn more? Go to http://micha-mahi.info/. Enter O382 in the search box to learn more about \"Weeks 18 to 22 of Your Pregnancy: Care Instructions. \" Current as of: May 30, 2016 Content Version: 11.2 © 9825-8450 [employer]. Care instructions adapted under license by [employer] (which disclaims liability or warranty for this information). If you have questions about a medical condition or this instruction, always ask your healthcare professional. [place] [address] any warranty or liability for your use of this information. Pregnancy Precautions: Care Instructions Your Care Instructions There is no sure way to prevent labor before your due date ( labor) or to prevent most other pregnancy problems. But there are things you can do to increase your chances of a healthy pregnancy.  Go to your appointments, follow your doctor's advice, and take good care of yourself. Eat well, and exercise (if your doctor agrees). And make sure to drink plenty of water. Follow-up care is a key part of your treatment and safety. Be sure to make and go to all appointments, and call your doctor if you are having problems. It's also a good idea to know your test results and keep a list of the medicines you take. How can you care for yourself at home? · Make sure you go to your prenatal appointments. At each visit, your doctor will check your blood pressure. Your doctor will also check to see if you have protein in your urine. High blood pressure and protein in urine are signs of preeclampsia. This condition can be dangerous for you and your baby. · Drink plenty of fluids, enough so that your urine is light yellow or clear like water. Dehydration can cause contractions. If you have kidney, heart, or liver disease and have to limit fluids, talk with your doctor before you increase the amount of fluids you drink. · Tell your doctor right away if you notice any symptoms of an infection, such as: ¨ Burning when you urinate. ¨ A foul-smelling discharge from your vagina. ¨ Vaginal itching. ¨ Unexplained fever. ¨ Unusual pain or soreness in your uterus or lower belly. · Eat a balanced diet. Include plenty of foods that are high in calcium and iron. ¨ Foods high in calcium include milk, cheese, yogurt, almonds, and broccoli. ¨ Foods high in iron include red meat, shellfish, poultry, eggs, beans, raisins, whole-grain bread, and leafy green vegetables. · Do not smoke. If you need help quitting, talk to your doctor about stop-smoking programs and medicines. These can increase your chances of quitting for good. · Do not drink alcohol or use illegal drugs. · Follow your doctor's directions about activity. Your doctor will let you know how much, if any, exercise you can do. · Ask your doctor if you can have sex. If you are at risk for early labor, your doctor may ask you to not have sex. · Take care to prevent falls. During pregnancy, your joints are loose, and your balance is off. Sports such as bicycling, skiing, or in-line skating can increase your risk of falling. And don't ride horses or motorcycles, dive, water ski, scuba dive, or parachute jump while you are pregnant. · Avoid getting very hot. Do not use saunas or hot tubs. Avoid staying out in the sun in hot weather for long periods. Take acetaminophen (Tylenol) to lower a high fever. · Do not take any over-the-counter or herbal medicines or supplements without talking to your doctor or pharmacist first. 
When should you call for help? Call 911 anytime you think you may need emergency care. For example, call if: 
· You passed out (lost consciousness). · You have severe vaginal bleeding. · You have severe pain in your belly or pelvis. · You have had fluid gushing or leaking from your vagina and you know or think the umbilical cord is bulging into your vagina. If this happens, immediately get down on your knees so your rear end (buttocks) is higher than your head. This will decrease the pressure on the cord until help arrives. Call your doctor now or seek immediate medical care if: 
· You have signs of preeclampsia, such as: 
¨ Sudden swelling of your face, hands, or feet. ¨ New vision problems (such as dimness or blurring). ¨ A severe headache. · You have any vaginal bleeding. · You have belly pain or cramping. · You have a fever. · You have had regular contractions (with or without pain) for an hour. This means that you have 8 or more within 1 hour or 4 or more in 20 minutes after you change your position and drink fluids. · You have a sudden release of fluid from your vagina. · You have low back pain or pelvic pressure that does not go away. · You notice that your baby has stopped moving or is moving much less than normal. 
Watch closely for changes in your health, and be sure to contact your doctor if you have any problems. Where can you learn more? Go to http://micha-mahi.info/. Enter [id number] in the search box to learn more about \"Pregnancy Precautions: Care Instructions. \" Current as of: May 30, 2016 Content Version: 11.2 © 1710-1146 [employer]. Care instructions adapted under license by [employer] (which disclaims liability or warranty for this information). If you have questions about a medical condition or this instruction, always ask your healthcare professional. [address] any warranty or liability for your use of this information. Discharge Orders None [facility] SERVICES! Dear Abena Good: Thank you for requesting a Digicompanion account. Our records indicate that you already have an active Digicompanion account. You can access your account anytime at [email or web address] Did you know that you can access your hospital and ER discharge instructions at any time in Digicompanion? You can also review all of your test results from your hospital stay or ER visit. Additional Information If you have questions, please visit the Frequently Asked Questions section of the Digicompanion website at [email or web address] Remember, Digicompanion is NOT to be used for urgent needs. For medical emergencies, dial 911. Now available from your iPhone and Android! General Information Please provide this summary of care documentation to your next provider. Patient Signature:  ____________________________________________________________ Date:  ____________________________________________________________  
  
Swati Campso Provider Signature:  ____________________________________________________________ Date:  ____________________________________________________________

## 2017-06-14 NOTE — DISCHARGE INSTRUCTIONS
Weeks 18 to 22 of Your Pregnancy: Care Instructions  Your Care Instructions    Your baby is continuing to develop quickly. At this stage, babies can now suck their thumbs,  firmly with their hands, and open and close their eyelids. Sometime between 18 and 22 weeks, you will start to feel your baby move. At first, these small fetal movements feel like fluttering or \"butterflies. \" Some women say that they feel like gas bubbles. As the baby grows, these movements will become stronger. You may also notice that your baby kicks and hiccups. During this time, you may find that your nausea and fatigue are gone. Overall, you may feel better and have more energy than you did in your first trimester. But you may also have new discomforts now, such as sleep problems or leg cramps. This care sheet can help you ease these discomforts. Follow-up care is a key part of your treatment and safety. Be sure to make and go to all appointments, and call your doctor if you are having problems. It's also a good idea to know your test results and keep a list of the medicines you take. How can you care for yourself at home? Ease sleep problems  · Avoid caffeine in drinks or chocolate late in the day. · Get some exercise every day. · Take a warm shower or bath before bed. · Have a light snack or glass of milk at bedtime. · Do relaxation exercises in bed to calm your mind and body. · Support your legs and back with extra pillows. Try a pillow between your legs if you sleep on your side. · Do not use sleeping pills or alcohol. They could harm your baby. Ease leg cramps  · Do not massage your calf during the cramp. · Sit on a firm bed or chair. Straighten your leg, and bend your foot (flex your ankle) slowly upward, toward your knee. Bend your toes up and down. · Stand on a cool, flat surface. Stretch your toes upward, and take small steps walking on your heels.   · Use a heating pad or hot water bottle to help with muscle ache.  Prevent leg cramps  · Be sure to get enough calcium. If you are worried that you are not getting enough, talk to your doctor. · Exercise every day, and stretch your legs before bed. · Take a warm bath before bed, and try leg warmers at night. Where can you learn more? Go to http://micha-mahi.info/. Enter T044 in the search box to learn more about \"Weeks 18 to 22 of Your Pregnancy: Care Instructions. \"  Current as of: May 30, 2016  Content Version: 11.2  © 0194-5460 [employer]. Care instructions adapted under license by [facility] (which disclaims liability or warranty for this information). If you have questions about a medical condition or this instruction, always ask your healthcare professional. [place] [address] any warranty or liability for your use of this information. Pregnancy Precautions: Care Instructions  Your Care Instructions  There is no sure way to prevent labor before your due date ( labor) or to prevent most other pregnancy problems. But there are things you can do to increase your chances of a healthy pregnancy. Go to your appointments, follow your doctor's advice, and take good care of yourself. Eat well, and exercise (if your doctor agrees). And make sure to drink plenty of water. Follow-up care is a key part of your treatment and safety. Be sure to make and go to all appointments, and call your doctor if you are having problems. It's also a good idea to know your test results and keep a list of the medicines you take. How can you care for yourself at home? · Make sure you go to your prenatal appointments. At each visit, your doctor will check your blood pressure. Your doctor will also check to see if you have protein in your urine. High blood pressure and protein in urine are signs of preeclampsia. This condition can be dangerous for you and your baby.   · Drink plenty of fluids, enough so that your urine is light yellow or clear like water. Dehydration can cause contractions. If you have kidney, heart, or liver disease and have to limit fluids, talk with your doctor before you increase the amount of fluids you drink. · Tell your doctor right away if you notice any symptoms of an infection, such as:  ¨ Burning when you urinate. ¨ A foul-smelling discharge from your vagina. ¨ Vaginal itching. ¨ Unexplained fever. ¨ Unusual pain or soreness in your uterus or lower belly. · Eat a balanced diet. Include plenty of foods that are high in calcium and iron. ¨ Foods high in calcium include milk, cheese, yogurt, almonds, and broccoli. ¨ Foods high in iron include red meat, shellfish, poultry, eggs, beans, raisins, whole-grain bread, and leafy green vegetables. · Do not smoke. If you need help quitting, talk to your doctor about stop-smoking programs and medicines. These can increase your chances of quitting for good. · Do not drink alcohol or use illegal drugs. · Follow your doctor's directions about activity. Your doctor will let you know how much, if any, exercise you can do. · Ask your doctor if you can have sex. If you are at risk for early labor, your doctor may ask you to not have sex. · Take care to prevent falls. During pregnancy, your joints are loose, and your balance is off. Sports such as bicycling, skiing, or in-line skating can increase your risk of falling. And don't ride horses or motorcycles, dive, water ski, scuba dive, or parachute jump while you are pregnant. · Avoid getting very hot. Do not use saunas or hot tubs. Avoid staying out in the sun in hot weather for long periods. Take acetaminophen (Tylenol) to lower a high fever. · Do not take any over-the-counter or herbal medicines or supplements without talking to your doctor or pharmacist first.  When should you call for help? Call 911 anytime you think you may need emergency care.  For example, call if:  · You passed out (lost consciousness). · You have severe vaginal bleeding. · You have severe pain in your belly or pelvis. · You have had fluid gushing or leaking from your vagina and you know or think the umbilical cord is bulging into your vagina. If this happens, immediately get down on your knees so your rear end (buttocks) is higher than your head. This will decrease the pressure on the cord until help arrives. Call your doctor now or seek immediate medical care if:  · You have signs of preeclampsia, such as:  ¨ Sudden swelling of your face, hands, or feet. ¨ New vision problems (such as dimness or blurring). ¨ A severe headache. · You have any vaginal bleeding. · You have belly pain or cramping. · You have a fever. · You have had regular contractions (with or without pain) for an hour. This means that you have 8 or more within 1 hour or 4 or more in 20 minutes after you change your position and drink fluids. · You have a sudden release of fluid from your vagina. · You have low back pain or pelvic pressure that does not go away. · You notice that your baby has stopped moving or is moving much less than normal.  Watch closely for changes in your health, and be sure to contact your doctor if you have any problems. Where can you learn more? Go to http://micha-mahi.info/. Enter [id number] in the search box to learn more about \"Pregnancy Precautions: Care Instructions. \"  Current as of: May 30, 2016  Content Version: 11.2  © 7629-3529 [employer]. Care instructions adapted under license by [employer] (which disclaims liability or warranty for this information). If you have questions about a medical condition or this instruction, always ask your healthcare professional. [place] [address] any warranty or liability for your use of this information.

## 2017-06-14 NOTE — PROGRESS NOTES
SUSANA 2 admit for N/V x 24 hrs. Unable to labetalol down for [facility]. Dr Donna Hardwick at bedside. Orders received.

## 2017-06-14 NOTE — IP AVS SNAPSHOT
Current Discharge Medication List  
  
ASK your doctor about these medications Dose & Instructions Dispensing Information Comments Morning Noon Evening Bedtime  
 aspirin delayed-release 81 mg tablet Your last dose was: Your next dose is:    
   
   
 Dose:  81 mg Take 1 Tab by mouth daily. Quantity:  30 Tab Refills:  11  
     
   
   
   
  
 doxylamine-pyridoxine 10-10 mg Tbec Commonly known as:  Mamie Solo Your last dose was: Your next dose is:    
   
   
 Dose:  2 Tab Take 2 Tabs by mouth nightly. May add 1 tab in morning and 1 tab at noon if needed Quantity:  120 Tab Refills:  5  
     
   
   
   
  
 labetalol 200 mg tablet Commonly known as:  Nadege Pulse Your last dose was: Your next dose is:    
   
   
 Dose:  200 mg Take 1 Tab by mouth three (3) times daily. Indications: hypertension Quantity:  90 Tab Refills:  5 -iron-FA-om-3s-dha-epa 3.33 mg iron- 0.33 mg Raymona Ing Commonly known as:  Pat Corbett Your last dose was: Your next dose is:    
   
   
 Dose:  3 Tab Take 3 Tabs by mouth daily. Quantity:  90 Tab Refills:  11

## 2017-06-14 NOTE — ED PROVIDER NOTES
Chief Complaint:  Nausea and vomiting    27 y.o. female  at 20w6d  weeks gestation who is seen for nausea and vomiting. Reports 2 days of nausea and vomiting. Unable to keep down fluids or food. No diarrhea. No fever. No abd pain. No vag bleeding or discharge. No contractions. No headache or swelling. HISTORY:    History   Sexual Activity    Sexual activity: Yes    Partners: Male    Birth control/ protection: None     Patient's last menstrual period was 2017. Social History     Social History    Marital status:      Spouse name: N/A    Number of children: N/A    Years of education: N/A     Occupational History    Not on file. Social History Main Topics    Smoking status: Former Smoker     Packs/day: 0.20     Years: 2.00    Smokeless tobacco: Current User      Comment: vapes occ     Alcohol use No    Drug use: No    Sexual activity: Yes     Partners: Male     Birth control/ protection: None     Other Topics Concern    Not on file     Social History Narrative       No past surgical history on file. Past Medical History:   Diagnosis Date    Allergic rhinitis 2014    Depression 2014    HTN (hypertension) 2014    Nicotine vapor product user     Unspecified hypothyroidism 2014         ROS:  A 12 point review of symptoms negative except for chief complaint as described above. PHYSICAL EXAM:  Last menstrual period 2017. Constitutional: The patient appears well, alert, oriented x 3. Cardiovascular: Heart RRR, no murmurs.    Respiratory: Lungs clear, no respiratory distress  GI: Abdomen soft, nontender, no guarding  No fundal tenderness  Musculoskeletal: no cva tenderness  Upper ext: no edema, reflexes +2  Lower ext: no edema, neg nilesh's, reflexes +2  Skin: no rashes or lesions  Psychiatric:Mood/ Affect: appropriate  Genitourinary: SVE:deferred  FHT:+    Recent Results (from the past 12 hour(s))   URINALYSIS W/ RFLX MICROSCOPIC Collection Time: 06/14/17  6:20 PM   Result Value Ref Range    Color YELLOW      Appearance CLOUDY      Specific gravity 1.008 1.001 - 1.023      pH (UA) 7.0 5.0 - 9.0      Protein NEGATIVE  NEG mg/dL    Glucose NEGATIVE  mg/dL    Ketone NEGATIVE  NEG mg/dL    Bilirubin NEGATIVE  NEG      Blood NEGATIVE  NEG      Urobilinogen 0.2 0.2 - 1.0 EU/dL    Nitrites NEGATIVE  NEG      Leukocyte Esterase TRACE (A) NEG      WBC 5-10 0 /hpf    RBC 3-5 0 /hpf    Epithelial cells 5-10 0 /hpf    Bacteria 3+ (H) 0 /hpf    Other observations RESULTS VERIFIED MANUALLY     CBC WITH AUTOMATED DIFF    Collection Time: 06/14/17  7:10 PM   Result Value Ref Range    WBC 10.4 4.3 - 11.1 K/uL    RBC 3.94 (L) 4.05 - 5.25 M/uL    HGB 11.8 11.7 - 15.4 g/dL    HCT 34.0 (L) 35.8 - 46.3 %    MCV 86.3 79.6 - 97.8 FL    MCH 29.9 26.1 - 32.9 PG    MCHC 34.7 31.4 - 35.0 g/dL    RDW 12.6 11.9 - 14.6 %    PLATELET 118 643 - 924 K/uL    MPV 10.7 (L) 10.8 - 14.1 FL    DF AUTOMATED      NEUTROPHILS 74 43 - 78 %    LYMPHOCYTES 18 13 - 44 %    MONOCYTES 7 4.0 - 12.0 %    EOSINOPHILS 1 0.5 - 7.8 %    BASOPHILS 0 0.0 - 2.0 %    IMMATURE GRANULOCYTES 0.3 0.0 - 5.0 %    ABS. NEUTROPHILS 7.6 1.7 - 8.2 K/UL    ABS. LYMPHOCYTES 1.8 0.5 - 4.6 K/UL    ABS. MONOCYTES 0.8 0.1 - 1.3 K/UL    ABS. EOSINOPHILS 0.1 0.0 - 0.8 K/UL    ABS. BASOPHILS 0.0 0.0 - 0.2 K/UL    ABS. IMM. GRANS. 0.0 0.0 - 0.5 K/UL   METABOLIC PANEL, COMPREHENSIVE    Collection Time: 06/14/17  7:10 PM   Result Value Ref Range    Sodium 141 136 - 145 mmol/L    Potassium 3.3 (L) 3.5 - 5.1 mmol/L    Chloride 106 98 - 107 mmol/L    CO2 25 21 - 32 mmol/L    Anion gap 10 7 - 16 mmol/L    Glucose 88 65 - 100 mg/dL    BUN 6 6 - 23 MG/DL    Creatinine 0.56 (L) 0.6 - 1.0 MG/DL    GFR est AA >60 >60 ml/min/1.73m2    GFR est non-AA >60 >60 ml/min/1.73m2    Calcium 9.0 8.3 - 10.4 MG/DL    Bilirubin, total 0.4 0.2 - 1.1 MG/DL    ALT (SGPT) 16 12 - 65 U/L    AST (SGOT) 15 15 - 37 U/L    Alk.  phosphatase 53 50 - 136 U/L    Protein, total 6.5 6.3 - 8.2 g/dL    Albumin 3.1 (L) 3.5 - 5.0 g/dL    Globulin 3.4 2.3 - 3.5 g/dL    A-G Ratio 0.9 (L) 1.2 - 3.5           I personally reviewed pt's medical record including relevant labs and ultrasounds    Assessment/Plan:  26 yo G1 at 20w6d with nausea and vomiting with mild dehydration- feeling much better after iv fluids and zofran  Able to tolerate po  rx zofran  bp elevated- pt has not been taking meds secondary to vomiting. Tolerated tonight.   Mild hypokalemia- rec po potassium with gatorade and bananas

## 2017-07-18 ENCOUNTER — HOSPITAL ENCOUNTER (OUTPATIENT)
Age: 27
Setting detail: OBSERVATION
LOS: 1 days | Discharge: HOME | End: 2017-07-19
Attending: OBSTETRICS & GYNECOLOGY | Admitting: OBSTETRICS & GYNECOLOGY
Payer: COMMERCIAL

## 2017-07-18 DIAGNOSIS — I10 ESSENTIAL HYPERTENSION: ICD-10-CM

## 2017-07-18 DIAGNOSIS — O14.92 PREECLAMPSIA, SECOND TRIMESTER: Primary | ICD-10-CM

## 2017-07-18 PROBLEM — O14.90 PREECLAMPSIA: Status: ACTIVE | Noted: 2017-07-18

## 2017-07-18 PROBLEM — O10.019 BENIGN ESSENTIAL HTN, CHRONIC, ANTEPARTUM: Status: ACTIVE | Noted: 2017-07-18

## 2017-07-18 LAB
ALBUMIN SERPL BCP-MCNC: 2.7 G/DL (ref 3.5–5)
ALBUMIN/GLOB SERPL: 0.7 {RATIO} (ref 1.2–3.5)
ALP SERPL-CCNC: 68 U/L (ref 50–136)
ALT SERPL-CCNC: 31 U/L (ref 12–65)
ANION GAP BLD CALC-SCNC: 10 MMOL/L (ref 7–16)
APPEARANCE UR: NORMAL
AST SERPL W P-5'-P-CCNC: 26 U/L (ref 15–37)
BASOPHILS # BLD AUTO: 0 K/UL (ref 0–0.2)
BASOPHILS # BLD: 0 % (ref 0–2)
BILIRUB SERPL-MCNC: 0.4 MG/DL (ref 0.2–1.1)
BILIRUB UR QL: NEGATIVE
BUN SERPL-MCNC: 6 MG/DL (ref 6–23)
CALCIUM SERPL-MCNC: 8.7 MG/DL (ref 8.3–10.4)
CHLORIDE SERPL-SCNC: 104 MMOL/L (ref 98–107)
CO2 SERPL-SCNC: 23 MMOL/L (ref 21–32)
COLOR UR: YELLOW
CREAT SERPL-MCNC: 0.54 MG/DL (ref 0.6–1)
DIFFERENTIAL METHOD BLD: ABNORMAL
EOSINOPHIL # BLD: 0.1 K/UL (ref 0–0.8)
EOSINOPHIL NFR BLD: 1 % (ref 0.5–7.8)
ERYTHROCYTE [DISTWIDTH] IN BLOOD BY AUTOMATED COUNT: 12.4 % (ref 11.9–14.6)
GLOBULIN SER CALC-MCNC: 4 G/DL (ref 2.3–3.5)
GLUCOSE SERPL-MCNC: 89 MG/DL (ref 65–100)
GLUCOSE UR STRIP.AUTO-MCNC: NEGATIVE MG/DL
GLUCOSE, GLUUPC: NEGATIVE
HCT VFR BLD AUTO: 34.1 % (ref 35.8–46.3)
HGB BLD-MCNC: 12 G/DL (ref 11.7–15.4)
HGB UR QL STRIP: NEGATIVE
IMM GRANULOCYTES # BLD: 0 K/UL (ref 0–0.5)
IMM GRANULOCYTES NFR BLD AUTO: 0.3 % (ref 0–5)
KETONES UR QL STRIP.AUTO: NEGATIVE MG/DL
KETONES UR-MCNC: NEGATIVE MG/DL
LEUKOCYTE ESTERASE UR QL STRIP.AUTO: NEGATIVE
LYMPHOCYTES # BLD AUTO: 12 % (ref 13–44)
LYMPHOCYTES # BLD: 1.5 K/UL (ref 0.5–4.6)
MCH RBC QN AUTO: 30.3 PG (ref 26.1–32.9)
MCHC RBC AUTO-ENTMCNC: 35.2 G/DL (ref 31.4–35)
MCV RBC AUTO: 86.1 FL (ref 79.6–97.8)
MONOCYTES # BLD: 0.7 K/UL (ref 0.1–1.3)
MONOCYTES NFR BLD AUTO: 5 % (ref 4–12)
NEUTS SEG # BLD: 10 K/UL (ref 1.7–8.2)
NEUTS SEG NFR BLD AUTO: 82 % (ref 43–78)
NITRITE UR QL STRIP.AUTO: NEGATIVE
PH UR STRIP: 7 [PH] (ref 5–9)
PLATELET # BLD AUTO: 270 K/UL (ref 150–450)
PMV BLD AUTO: 10.3 FL (ref 10.8–14.1)
POTASSIUM SERPL-SCNC: 3.5 MMOL/L (ref 3.5–5.1)
PROT SERPL-MCNC: 6.7 G/DL (ref 6.3–8.2)
PROT UR QL: NEGATIVE
PROT UR STRIP-MCNC: NEGATIVE MG/DL
RBC # BLD AUTO: 3.96 M/UL (ref 4.05–5.25)
SODIUM SERPL-SCNC: 137 MMOL/L (ref 136–145)
SP GR UR REFRACTOMETRY: 1 (ref 1–1.02)
URATE SERPL-MCNC: 4.1 MG/DL (ref 2.6–6)
UROBILINOGEN UR QL STRIP.AUTO: 0.2 EU/DL (ref 0.2–1)
WBC # BLD AUTO: 12.3 K/UL (ref 4.3–11.1)

## 2017-07-18 PROCEDURE — 85025 COMPLETE CBC W/AUTO DIFF WBC: CPT | Performed by: OBSTETRICS & GYNECOLOGY

## 2017-07-18 PROCEDURE — 99283 EMERGENCY DEPT VISIT LOW MDM: CPT

## 2017-07-18 PROCEDURE — 99218 HC RM OBSERVATION: CPT

## 2017-07-18 PROCEDURE — 74011250637 HC RX REV CODE- 250/637: Performed by: OBSTETRICS & GYNECOLOGY

## 2017-07-18 PROCEDURE — 80053 COMPREHEN METABOLIC PANEL: CPT | Performed by: OBSTETRICS & GYNECOLOGY

## 2017-07-18 PROCEDURE — 93005 ELECTROCARDIOGRAM TRACING: CPT | Performed by: OBSTETRICS & GYNECOLOGY

## 2017-07-18 PROCEDURE — 81003 URINALYSIS AUTO W/O SCOPE: CPT | Performed by: OBSTETRICS & GYNECOLOGY

## 2017-07-18 PROCEDURE — 74011250636 HC RX REV CODE- 250/636: Performed by: OBSTETRICS & GYNECOLOGY

## 2017-07-18 PROCEDURE — 59025 FETAL NON-STRESS TEST: CPT

## 2017-07-18 PROCEDURE — 36415 COLL VENOUS BLD VENIPUNCTURE: CPT | Performed by: OBSTETRICS & GYNECOLOGY

## 2017-07-18 PROCEDURE — 81002 URINALYSIS NONAUTO W/O SCOPE: CPT | Performed by: OBSTETRICS & GYNECOLOGY

## 2017-07-18 PROCEDURE — 84156 ASSAY OF PROTEIN URINE: CPT | Performed by: OBSTETRICS & GYNECOLOGY

## 2017-07-18 PROCEDURE — 84550 ASSAY OF BLOOD/URIC ACID: CPT | Performed by: OBSTETRICS & GYNECOLOGY

## 2017-07-18 RX ORDER — LABETALOL 200 MG/1
400 TABLET, FILM COATED ORAL EVERY 8 HOURS
Status: DISCONTINUED | OUTPATIENT
Start: 2017-07-18 | End: 2017-07-19

## 2017-07-18 RX ORDER — LABETALOL 200 MG/1
200 TABLET, FILM COATED ORAL ONCE
Status: DISCONTINUED | OUTPATIENT
Start: 2017-07-18 | End: 2017-07-18

## 2017-07-18 RX ORDER — ZOLPIDEM TARTRATE 5 MG/1
5 TABLET ORAL
Status: DISCONTINUED | OUTPATIENT
Start: 2017-07-18 | End: 2017-07-19 | Stop reason: HOSPADM

## 2017-07-18 RX ORDER — NIFEDIPINE 10 MG/1
20 CAPSULE ORAL EVERY 6 HOURS
Status: DISCONTINUED | OUTPATIENT
Start: 2017-07-18 | End: 2017-07-19

## 2017-07-18 RX ORDER — BETAMETHASONE SODIUM PHOSPHATE AND BETAMETHASONE ACETATE 3; 3 MG/ML; MG/ML
12 INJECTION, SUSPENSION INTRA-ARTICULAR; INTRALESIONAL; INTRAMUSCULAR; SOFT TISSUE EVERY 24 HOURS
Status: COMPLETED | OUTPATIENT
Start: 2017-07-18 | End: 2017-07-19

## 2017-07-18 RX ADMIN — LABETALOL HYDROCHLORIDE 400 MG: 200 TABLET, FILM COATED ORAL at 19:30

## 2017-07-18 RX ADMIN — BETAMETHASONE SODIUM PHOSPHATE AND BETAMETHASONE ACETATE 12 MG: 3; 3 INJECTION, SUSPENSION INTRA-ARTICULAR; INTRALESIONAL; INTRAMUSCULAR at 12:56

## 2017-07-18 RX ADMIN — LABETALOL HYDROCHLORIDE 400 MG: 200 TABLET, FILM COATED ORAL at 12:17

## 2017-07-18 RX ADMIN — NIFEDIPINE 20 MG: 10 CAPSULE, LIQUID FILLED ORAL at 20:37

## 2017-07-18 RX ADMIN — ZOLPIDEM TARTRATE 5 MG: 5 TABLET ORAL at 22:08

## 2017-07-18 NOTE — PROGRESS NOTES
Pt presented to L&D triage complaining of increased BP and blurred vision. Pt denies headache, uterine contractions, leaking fluid or vaginal bleeding. EFM on. Urine dipped neg-protein, neg-glucose and neg-ketones.

## 2017-07-18 NOTE — PROGRESS NOTES
1900 Bedside and Verbal shift change report given to DULCE Holliday RN (oncoming nurse) by TAMI Keyes RN (offgoing nurse). Report included the following information SBAR.   1923 /99  1930 Labetalol 400 mg po given early. See MAR. Pt. Requests something to help her sleep. Ambien ordered. 2028 /114. Dr. Grace Hatfield updated on pt. BP per RN. Orders to give Procardia 20 mg po received. 2037 Procardia 20 mg po given per orders. See MAR. 2044 Pt. States she has had pain in her upper left arm and into her chest for the last 20 min. O2 Sat applied. Pulse ox 97%  2049 Dr. Grace Hatfield updated on pt. Chest pain. 12 lead EKG ordered per Dr. Grace Hatfield. RT called per RN. She states she is on her way. 2133 NST reactive.  EFM and toco removed per [address] [place]

## 2017-07-18 NOTE — IP AVS SNAPSHOT
[facility] [address] 
[phone] Patient: Gabbi Medeiros MRN: [id number] :1990 Current Discharge Medication List  
  
ASK your doctor about these medications Dose & Instructions Dispensing Information Comments Morning Noon Evening Bedtime  
 aspirin delayed-release 81 mg tablet Your last dose was: Your next dose is:    
   
   
 Dose:  81 mg Take 1 Tab by mouth daily. Quantity:  30 Tab Refills:  11  
     
   
   
   
  
 doxylamine-pyridoxine 10-10 mg Tbec Commonly known as:  Alba Handy Your last dose was: Your next dose is:    
   
   
 Dose:  2 Tab Take 2 Tabs by mouth nightly. May add 1 tab in morning and 1 tab at noon if needed Quantity:  120 Tab Refills:  5  
     
   
   
   
  
 labetalol 200 mg tablet Commonly known as:  Lucas Pike Your last dose was: Your next dose is:    
   
   
 Dose:  200 mg Take 1 Tab by mouth three (3) times daily. Indications: hypertension Quantity:  90 Tab Refills:  5 -iron-FA-om-3s-dha-epa 3.33 mg iron- 0.33 mg Storm Lamp Commonly known as:  Nunu Sands Your last dose was: Your next dose is:    
   
   
 Dose:  3 Tab Take 3 Tabs by mouth daily. Quantity:  90 Tab Refills:  11

## 2017-07-18 NOTE — PROGRESS NOTES
Pt transferred to room 439 for 23 hr observation. Pt aware of POC and denies needs or questions at this time.

## 2017-07-18 NOTE — PROGRESS NOTES
Dr Bre Durand called. Blood bank states they have one unit PRBC's here for the patient but will have to order the other one from the blood connection- could take 30 min -2-3 hours. Report given. States 1 unit is fine. Doesn't need to wait for the 2nd unit to be ready and is not planning on needing to give it.

## 2017-07-18 NOTE — H&P
Chief Complaint:  Swelling and blurred vision    27 y.o. female   at 25w5d  weeks gestation who is seen for edema and blurred vision. Pt has history of chronic htn and has been on meds since age 15. Today when she woke up, she felt like she was more swollen than usual for her. Also having some blurred vision. No headache. No abd pain. No vag bleeding or contractions. Good fetal movement. Pt last took her bp meds 2 hours ago. HISTORY:    History   Sexual Activity    Sexual activity: Yes    Partners: Male    Birth control/ protection: None     Patient's last menstrual period was 2017. Social History     Social History    Marital status:      Spouse name: N/A    Number of children: N/A    Years of education: N/A     Occupational History    Not on file. Social History Main Topics    Smoking status: Former Smoker     Packs/day: 0.20     Years: 2.00    Smokeless tobacco: Current User      Comment: vapes occ     Alcohol use No    Drug use: No    Sexual activity: Yes     Partners: Male     Birth control/ protection: None     Other Topics Concern    Not on file     Social History Narrative       History reviewed. No pertinent surgical history. Past Medical History:   Diagnosis Date    Allergic rhinitis 2014    Depression 2014    HTN (hypertension) 2014    Nicotine vapor product user     Preeclampsia 2017    Unspecified hypothyroidism 2014         ROS:  A 12 point review of symptoms negative except for chief complaint as described above. PHYSICAL EXAM:  Blood pressure (!) 178/108, pulse 76, temperature 98.7 °F (37.1 °C), resp. rate 18, height 5' 3\" (1.6 m), weight 74.8 kg (165 lb), last menstrual period 2017. Repeat 180/108  Constitutional: The patient appears well, alert, oriented x 3. Cardiovascular: Heart RRR, no murmurs.    Respiratory: Lungs clear, no respiratory distress  GI: Abdomen soft, nontender, no guarding  No fundal tenderness  Musculoskeletal: no cva tenderness  Upper ext: +1 edema, reflexes +2  Lower ext: +1 edema, neg nilesh's, reflexes +2  Skin: no rashes or lesions  Psychiatric:Mood/ Affect: appropriate  Genitourinary: SVE:deferred  FHT:+  TOCO:no contractions  Urine dip- neg proteing    I personally reviewed pt's medical record including relevant labs     Assessment/Plan:  28 yo  at 25w10d with hx of chronic htn- presented to triage for swelling and blurred vision  Pt overall looks well, but bp higher than usual.  Will admit for observation, pih labs, mfm consult

## 2017-07-18 NOTE — PROGRESS NOTES
Confirmed that blood has been drawn and pt may eat. Reg diet ordered. Pt told she can order food anytime now. States understanding and phone/menu at bedside.

## 2017-07-18 NOTE — IP AVS SNAPSHOT
[address] [place] [address] 
[phone] Patient: Leonora Da Silva MRN: [id number] :1990 You are allergic to the following No active allergies Recent Documentation Height Weight Breastfeeding? BMI OB Status Smoking Status 1.6 m 74.8 kg Unknown 29.23 kg/m2 Pregnant Former Smoker Emergency Contacts Name Discharge Info Relation Home Work Mobile [address] [facility] CAREGIVER [3] Spouse [3] 21 [phone] Lida Rice  Mother [14] [phone] About your hospitalization You were admitted on:  2017 You last received care in the:  [facility] ANTEPARTUM You were discharged on:  2017 Unit phone number:  [phone] Why you were hospitalized Your primary diagnosis was:  Not on File Your diagnoses also included:  Benign Essential Htn, Chronic, Antepartum, Preeclampsia, Benign Essential Hypertension Affecting Pregnancy, Antepartum Providers Seen During Your Hospitalizations Provider Role Specialty Primary office phone Roly Reveles MD Attending Provider Obstetrics & Gynecology [phone] Your Primary Care Physician (PCP) Primary Care Physician Office Phone Office Fax Chaka Mcfarlane [phone] Follow-up Information None Your Appointments 2017  9:30 AM EDT ANATOMY WITH [facility] with [facility] ULTRASOUND 2  
[facility] MATERNAL FETAL MEDICINE ([address] [place] [address]) [address] [place] [id number]  
[phone] 2017 10:45 AM EDT  
OB Consult with Lilly Mcarthur MD  
[address] [place] [address] ([address] [place] [address]) [address] [place] [id number]  
[phone]   9:00 AM EDT Ultrasound plus physician visit with Samira Ventura 3720  
 Merged with [facility]) [address] [facility] [id number]  
[phone]   9:45 AM EDT Ultrasound plus physician visit with Nick Henning MD  
Merged with [facility]) [address] [facility] [address]  
[phone] Current Discharge Medication List  
  
ASK your doctor about these medications Dose & Instructions Dispensing Information Comments Morning Noon Evening Bedtime  
 aspirin delayed-release 81 mg tablet Your last dose was: Your next dose is:    
   
   
 Dose:  81 mg Take 1 Tab by mouth daily. Quantity:  30 Tab Refills:  11  
     
   
   
   
  
 doxylamine-pyridoxine 10-10 mg Tbec Commonly known as:  Antonio Doe Your last dose was: Your next dose is:    
   
   
 Dose:  2 Tab Take 2 Tabs by mouth nightly. May add 1 tab in morning and 1 tab at noon if needed Quantity:  120 Tab Refills:  5  
     
   
   
   
  
 labetalol 200 mg tablet Commonly known as:  Floria Salt Your last dose was: Your next dose is:    
   
   
 Dose:  200 mg Take 1 Tab by mouth three (3) times daily. Indications: hypertension Quantity:  90 Tab Refills:  5 -iron-[id number]-[id number]-dha-epa 3.33 mg iron- 0.33 mg Gleaditi Wigginsmage Commonly known as:  Yinka Ocampo Your last dose was: Your next dose is:    
   
   
 Dose:  3 Tab Take 3 Tabs by mouth daily. Quantity:  90 Tab Refills:  11 Discharge Instructions Please follow up  at 9:15 at [facility] Fetal Medicine. Pregnancy Precautions: Care Instructions Your Care Instructions There is no sure way to prevent labor before your due date ( labor) or to prevent most other pregnancy problems. But there are things you can do to increase your chances of a healthy pregnancy.  Go to your appointments, follow your doctor's advice, and take good care of yourself. Eat well, and exercise (if your doctor agrees). And make sure to drink plenty of water. Follow-up care is a key part of your treatment and safety. Be sure to make and go to all appointments, and call your doctor if you are having problems. It's also a good idea to know your test results and keep a list of the medicines you take. How can you care for yourself at home? · Make sure you go to your prenatal appointments. At each visit, your doctor will check your blood pressure. Your doctor will also check to see if you have protein in your urine. High blood pressure and protein in urine are signs of preeclampsia. This condition can be dangerous for you and your baby. · Drink plenty of fluids, enough so that your urine is light yellow or clear like water. Dehydration can cause contractions. If you have kidney, heart, or liver disease and have to limit fluids, talk with your doctor before you increase the amount of fluids you drink. · Tell your doctor right away if you notice any symptoms of an infection, such as: ¨ Burning when you urinate. ¨ A foul-smelling discharge from your vagina. ¨ Vaginal itching. ¨ Unexplained fever. ¨ Unusual pain or soreness in your uterus or lower belly. · Eat a balanced diet. Include plenty of foods that are high in calcium and iron. ¨ Foods high in calcium include milk, cheese, yogurt, almonds, and broccoli. ¨ Foods high in iron include red meat, shellfish, poultry, eggs, beans, raisins, whole-grain bread, and leafy green vegetables. · Do not smoke. If you need help quitting, talk to your doctor about stop-smoking programs and medicines. These can increase your chances of quitting for good. · Do not drink alcohol or use illegal drugs. · Follow your doctor's directions about activity. Your doctor will let you know how much, if any, exercise you can do. · Ask your doctor if you can have sex. If you are at risk for early labor, your doctor may ask you to not have sex. · Take care to prevent falls. During pregnancy, your joints are loose, and your balance is off. Sports such as bicycling, skiing, or in-line skating can increase your risk of falling. And don't ride horses or motorcycles, dive, water ski, scuba dive, or parachute jump while you are pregnant. · Avoid getting very hot. Do not use saunas or hot tubs. Avoid staying out in the sun in hot weather for long periods. Take acetaminophen (Tylenol) to lower a high fever. · Do not take any over-the-counter or herbal medicines or supplements without talking to your doctor or pharmacist first. 
When should you call for help? Call 911 anytime you think you may need emergency care. For example, call if: 
· You passed out (lost consciousness). · You have severe vaginal bleeding. · You have severe pain in your belly or pelvis. · You have had fluid gushing or leaking from your vagina and you know or think the umbilical cord is bulging into your vagina. If this happens, immediately get down on your knees so your rear end (buttocks) is higher than your head. This will decrease the pressure on the cord until help arrives. Call your doctor now or seek immediate medical care if: 
· You have signs of preeclampsia, such as: 
¨ Sudden swelling of your face, hands, or feet. ¨ New vision problems (such as dimness or blurring). ¨ A severe headache. · You have any vaginal bleeding. · You have belly pain or cramping. · You have a fever. · You have had regular contractions (with or without pain) for an hour. This means that you have 8 or more within 1 hour or 4 or more in 20 minutes after you change your position and drink fluids. · You have a sudden release of fluid from your vagina. · You have low back pain or pelvic pressure that does not go away. · You notice that your baby has stopped moving or is moving much less than normal. 
Watch closely for changes in your health, and be sure to contact your doctor if you have any problems. Where can you learn more? Go to http://micha-mahi.info/. Enter [id number] in the search box to learn more about \"Pregnancy Precautions: Care Instructions. \" Current as of: March 16, 2017 Content Version: 11.3 © 6691-9337 [employer]. Care instructions adapted under license by [employer] (which disclaims liability or warranty for this information). If you have questions about a medical condition or this instruction, always ask your healthcare professional. [address] any warranty or liability for your use of this information. High Blood Pressure: Care Instructions Your Care Instructions If your blood pressure is usually above 140/90, you have high blood pressure, or hypertension. That means the top number is 140 or higher or the bottom number is 90 or higher, or both. Despite what a lot of people think, high blood pressure usually doesn't cause headaches or make you feel dizzy or lightheaded. It usually has no symptoms. But it does increase your risk for heart attack, stroke, and kidney or eye damage. The higher your blood pressure, the more your risk increases. Your doctor will give you a goal for your blood pressure. Your goal will be based on your health and your age. An example of a goal is to keep your blood pressure below 140/90. Lifestyle changes, such as eating healthy and being active, are always important to help lower blood pressure. You might also take medicine to reach your blood pressure goal. 
Follow-up care is a key part of your treatment and safety. Be sure to make and go to all appointments, and call your doctor if you are having problems. It's also a good idea to know your test results and keep a list of the medicines you take. How can you care for yourself at home? Medical treatment · If you stop taking your medicine, your blood pressure will go back up. You may take one or more types of medicine to lower your blood pressure. Be safe with medicines. Take your medicine exactly as prescribed. Call your doctor if you think you are having a problem with your medicine. · Talk to your doctor before you start taking aspirin every day. Aspirin can help certain people lower their risk of a heart attack or stroke. But taking aspirin isn't right for everyone, because it can cause serious bleeding. · See your doctor regularly. You may need to see the doctor more often at first or until your blood pressure comes down. · If you are taking blood pressure medicine, talk to your doctor before you take decongestants or anti-inflammatory medicine, such as ibuprofen. Some of these medicines can raise blood pressure. · Learn how to check your blood pressure at home. Lifestyle changes · Stay at a healthy weight. This is especially important if you put on weight around the waist. Losing even 10 pounds can help you lower your blood pressure. · If your doctor recommends it, get more exercise. Walking is a good choice. Bit by bit, increase the amount you walk every day. Try for at least 30 minutes on most days of the week. You also may want to swim, bike, or do other activities. · Avoid or limit alcohol. Talk to your doctor about whether you can drink any alcohol. · Try to limit how much sodium you eat to less than 2,300 milligrams (mg) a day. Your doctor may ask you to try to eat less than 1,500 mg a day. · Eat plenty of fruits (such as bananas and oranges), vegetables, legumes, whole grains, and low-fat dairy products. · Lower the amount of saturated fat in your diet. Saturated fat is found in animal products such as milk, cheese, and meat. Limiting these foods may help you lose weight and also lower your risk for heart disease. · Do not smoke. Smoking increases your risk for heart attack and stroke. If you need help quitting, talk to your doctor about stop-smoking programs and medicines. These can increase your chances of quitting for good. When should you call for help? Call 911 anytime you think you may need emergency care. This may mean having symptoms that suggest that your blood pressure is causing a serious heart or blood vessel problem. Your blood pressure may be over 180/110. For example, call 911 if: 
· You have symptoms of a heart attack. These may include: ¨ Chest pain or pressure, or a strange feeling in the chest. 
¨ Sweating. ¨ Shortness of breath. ¨ Nausea or vomiting. ¨ Pain, pressure, or a strange feeling in the back, neck, jaw, or upper belly or in one or both shoulders or arms. ¨ Lightheadedness or sudden weakness. ¨ A fast or irregular heartbeat. · You have symptoms of a stroke. These may include: 
¨ Sudden numbness, tingling, weakness, or loss of movement in your face, arm, or leg, especially on only one side of your body. ¨ Sudden vision changes. ¨ Sudden trouble speaking. ¨ Sudden confusion or trouble understanding simple statements. ¨ Sudden problems with walking or balance. ¨ A sudden, severe headache that is different from past headaches. · You have severe back or belly pain. Do not wait until your blood pressure comes down on its own. Get help right away. Call your doctor now or seek immediate care if: 
· Your blood pressure is much higher than normal (such as 180/110 or higher), but you don't have symptoms. · You think high blood pressure is causing symptoms, such as: ¨ Severe headache. ¨ Blurry vision. Watch closely for changes in your health, and be sure to contact your doctor if: 
· Your blood pressure measures 140/90 or higher at least 2 times. That means the top number is 140 or higher or the bottom number is 90 or higher, or both. · You think you may be having side effects from your blood pressure medicine. · Your blood pressure is usually normal, but it goes above normal at least 2 times. Where can you learn more? Go to http://micha-mahi.info/. Enter U978 in the search box to learn more about \"High Blood Pressure: Care Instructions. \" Current as of: August 8, 2016 Content Version: 11.3 © 6822-9617 [employer]. Care instructions adapted under license by [employer] (which disclaims liability or warranty for this information). If you have questions about a medical condition or this instruction, always ask your healthcare professional. [address] any warranty or liability for your use of this information. Discharge Orders None Introducing [facility]! Dear Aracely Canales: Thank you for requesting a [employer] account. Our records indicate that you already have an active [employer] account. You can access your account anytime at [email or web address] Did you know that you can access your hospital and ER discharge instructions at any time in [employer]? You can also review all of your test results from your hospital stay or ER visit. Additional Information If you have questions, please visit the Frequently Asked Questions section of the [employer] website at [email or web address] Remember, [employer] is NOT to be used for urgent needs. For medical emergencies, dial 911. Now available from your iPhone and Android! General Information Please provide this summary of care documentation to your next provider. Patient Signature:  ____________________________________________________________ Date:  ____________________________________________________________  
  
Shanelle Finger Provider Signature:  ____________________________________________________________ Date:  ____________________________________________________________

## 2017-07-19 VITALS
HEART RATE: 90 BPM | HEIGHT: 63 IN | SYSTOLIC BLOOD PRESSURE: 153 MMHG | WEIGHT: 165 LBS | RESPIRATION RATE: 16 BRPM | TEMPERATURE: 98.2 F | OXYGEN SATURATION: 97 % | BODY MASS INDEX: 29.23 KG/M2 | DIASTOLIC BLOOD PRESSURE: 92 MMHG

## 2017-07-19 PROBLEM — O10.019 BENIGN ESSENTIAL HTN, CHRONIC, ANTEPARTUM: Status: RESOLVED | Noted: 2017-07-18 | Resolved: 2017-07-19

## 2017-07-19 PROBLEM — O14.90 PREECLAMPSIA: Status: RESOLVED | Noted: 2017-07-18 | Resolved: 2017-07-19

## 2017-07-19 LAB
ATRIAL RATE: 88 BPM
CALCULATED P AXIS, ECG09: 20 DEGREES
CALCULATED R AXIS, ECG10: 60 DEGREES
CALCULATED T AXIS, ECG11: 42 DEGREES
COLLECT DURATION TIME UR: 24 HR
DIAGNOSIS, 93000: NORMAL
P-R INTERVAL, ECG05: 134 MS
PROT 24H UR-MRATE: NORMAL MG/24HR
PROT UR-MCNC: <6 MG/DL
Q-T INTERVAL, ECG07: 376 MS
QRS DURATION, ECG06: 92 MS
QTC CALCULATION (BEZET), ECG08: 454 MS
SPECIMEN VOL ?TM UR: 3300 ML
VENTRICULAR RATE, ECG03: 88 BPM

## 2017-07-19 PROCEDURE — 74011250637 HC RX REV CODE- 250/637: Performed by: OBSTETRICS & GYNECOLOGY

## 2017-07-19 PROCEDURE — 74011250636 HC RX REV CODE- 250/636: Performed by: OBSTETRICS & GYNECOLOGY

## 2017-07-19 PROCEDURE — 99218 HC RM OBSERVATION: CPT

## 2017-07-19 PROCEDURE — 59025 FETAL NON-STRESS TEST: CPT

## 2017-07-19 RX ORDER — NIFEDIPINE 30 MG/1
30 TABLET, EXTENDED RELEASE ORAL EVERY 12 HOURS
Qty: 60 TAB | Refills: 2 | Status: SHIPPED | OUTPATIENT
Start: 2017-07-19 | End: 2017-07-24

## 2017-07-19 RX ORDER — LABETALOL 200 MG/1
400 TABLET, FILM COATED ORAL EVERY 8 HOURS
Qty: 180 TAB | Refills: 2 | Status: SHIPPED | OUTPATIENT
Start: 2017-07-19 | End: 2018-03-13 | Stop reason: ALTCHOICE

## 2017-07-19 RX ORDER — NIFEDIPINE 30 MG/1
30 TABLET, EXTENDED RELEASE ORAL EVERY 12 HOURS
Status: DISCONTINUED | OUTPATIENT
Start: 2017-07-19 | End: 2017-07-19 | Stop reason: HOSPADM

## 2017-07-19 RX ORDER — NIFEDIPINE 10 MG/1
20 CAPSULE ORAL EVERY 6 HOURS
Status: DISCONTINUED | OUTPATIENT
Start: 2017-07-19 | End: 2017-07-19

## 2017-07-19 RX ORDER — LABETALOL 200 MG/1
400 TABLET, FILM COATED ORAL EVERY 8 HOURS
Status: DISCONTINUED | OUTPATIENT
Start: 2017-07-19 | End: 2017-07-19 | Stop reason: HOSPADM

## 2017-07-19 RX ADMIN — LABETALOL HYDROCHLORIDE 400 MG: 200 TABLET, FILM COATED ORAL at 05:13

## 2017-07-19 RX ADMIN — NIFEDIPINE 20 MG: 10 CAPSULE, LIQUID FILLED ORAL at 02:30

## 2017-07-19 RX ADMIN — NIFEDIPINE 20 MG: 10 CAPSULE, LIQUID FILLED ORAL at 07:37

## 2017-07-19 RX ADMIN — LABETALOL HYDROCHLORIDE 400 MG: 200 TABLET, FILM COATED ORAL at 12:38

## 2017-07-19 RX ADMIN — NIFEDIPINE 30 MG: 30 TABLET, FILM COATED, EXTENDED RELEASE ORAL at 15:14

## 2017-07-19 RX ADMIN — BETAMETHASONE SODIUM PHOSPHATE AND BETAMETHASONE ACETATE 12 MG: 3; 3 INJECTION, SUSPENSION INTRA-ARTICULAR; INTRALESIONAL; INTRAMUSCULAR at 12:38

## 2017-07-19 NOTE — PROGRESS NOTES
07/19/17 0514   Maternal Vital Signs   Temp 98.2 °F (36.8 °C)   Pulse (Heart Rate) 96   Resp Rate 17   Level of Consciousness Alert   /76   MAP (Calculated) 99   BP 1 Method Automatic   BP 1 Location Right arm   BP Patient Position Sitting     Labetalol 200 mg PO given (see MAR). Will continue to monitor BP. All needs met and call light within reach.

## 2017-07-19 NOTE — PROGRESS NOTES
Spoke with Dr. Ann Oro, [address] discussed r/t BP. Ordered to give Procardia @ 0230 and Labetalol at 0500.

## 2017-07-19 NOTE — PROGRESS NOTES
[facility].      7/19/2017      RE: Chuyita Eason      To Whom it May Concern: This is to certify that Chuyita Eason needs to be out of work until further notice. Please excuse this absences. Please feel free to contact my office if you have any questions or concerns. Thank you for your assistance in this matter.     Sincerely,      Henrik Mckay RN

## 2017-07-19 NOTE — PROGRESS NOTES
07/19/17 0005 07/19/17 0010   Maternal Vital Signs   Pulse (Heart Rate) 98 (!) 109   Level of Consciousness Alert Alert   /67 126/77   MAP (Calculated) 83 93   BP 1 Method Automatic Automatic   BP 1 Location Right arm Right arm   BP Patient Position Lying right side Sitting

## 2017-07-19 NOTE — PROGRESS NOTES
In to check on patient, patient sleeping with family at bedside.      Shift assessment completed by Jerrod Horowitz, reviewed at this time. Vital signs stable on room air (pt had HX of elevated BP on this shift, stable at this time- 130/63). HX, Allergies, and MAR reviewed. Will continue to monitor.

## 2017-07-19 NOTE — PROGRESS NOTES
Patient discharged home per MD order. Discharge instructions completed and patient verbalized understanding. Questions encouraged. Accompanied by family. Stable at discharge.

## 2017-07-19 NOTE — CONSULTS
Maternal Fetal Medicine Consult Note      Requesting ADRIANE Vang    Chief Complaint:  Pregnancy and Chronic HTN and Swelling. Samantha Hernandez History of Present Jenn Maynard is a 27 y.o.   with an estimated gestational age of 23w6d with Estimated Date of Delivery: 10/26/17. Admitted for preeclampsia evaluation due to patient feeling more swelling. Has been on Labetalol, 300 tid, sometimes misses doses. Pregnancy has been complicated by chronic hypertension, since age 15. No other complications or complaints. Worsk 40 hours per week as .   Patient denies HA, RUQ pain, vision changes, or other concerns. Fetus has been active without any recent decrease in movement activity. Appropriate growth on last ultrasound, needs US for growth soon. OB History    Para Term  AB Living   2    1    SAB TAB Ectopic Molar Multiple Live Births   1           # Outcome Date GA Lbr Polo/2nd Weight Sex Delivery Anes PTL Lv   2 Current            1 SAB 2016                   History reviewed. No pertinent surgical history.     Past Medical History:   Diagnosis Date    Allergic rhinitis 2014    Depression 2014    HTN (hypertension) 2014    Nicotine vapor product user     Preeclampsia 2017    Unspecified hypothyroidism 2014       Family History   Problem Relation Age of Onset    Hypertension Mother     Cancer Mother      Kidney    Hypertension Father     GERD Father     Cancer Father     Hypertension Brother     Stroke Paternal Grandmother     Heart Disease Paternal Grandfather     Cancer Maternal Grandfather      lung cancer       No Known Allergies    Current Facility-Administered Medications   Medication Dose Route Frequency    labetalol (NORMODYNE) tablet 400 mg  400 mg Oral Q8H    NIFEdipine ER (PROCARDIA XL) tablet 30 mg  30 mg Oral Q12H    zolpidem (AMBIEN) tablet 5 mg  5 mg Oral QHS PRN       Social History     Social History    Marital status:      Spouse name: N/A    Number of children: N/A    Years of education: N/A     Occupational History    Not on file. Social History Main Topics    Smoking status: Former Smoker     Packs/day: 0.20     Years: 2.00    Smokeless tobacco: Current User      Comment: vapes occ     Alcohol use No    Drug use: No    Sexual activity: Yes     Partners: Male     Birth control/ protection: None     Other Topics Concern    Not on file     Social History Narrative       Review of Systems  A comprehensive review of systems was negative except for that written in the HPI. Vitals:    Patient Vitals for the past 24 hrs:   BP   17 1238 148/73   17 0736 129/76   17 0600 118/67   17 0540 144/76   17 0514 144/76   17 0230 144/84   17 0010 126/77   17 0005 116/67   17 2208 130/63   17 2150 130/67   17 2131 147/72   17 2109 (!) 174/97   17 2051 (!) 171/96   17 2028 (!) 207/114   17 2006 (!) 175/101   17 1929 170/86   17 1927 (!) 181/99   17 1901 (!) 181/109   17 1853 (!) 169/97   17 1800 (!) 168/99   17 1742 141/86   17 1707 135/87   17 1640 141/89   17 1601 144/80   17 1531 126/65     Temp (24hrs), Av.2 °F (36.8 °C), Min:98.1 °F (36.7 °C), Max:98.2 °F (36.8 °C)      I&O:  701 - 1900  In: 0926 [P.O.:5986]  Out: 0 [Urine:2200]                 Exam:  Patient without distress.                Abdomen: soft, non-tender               Fundus: soft and non tender               Fundal Height: 25 cm               Right Upper Quadrant: non-tender               Lower Extremity Edema: No               Patellar Reflexes: 1+ bilaterally               Clonus: absent    Uterine Activity: Frequency (min): 0 Intensity: Mild                                 Membranes: Membrane Status: Intact                              Fetal Heart Rate: Mode: ExternalFetal Heart Rate: 135          Labs:   CBC:    Recent Labs      17   14117   0935  06/14/17   1910  03/15/17   1101 03/15/17  02/27/17   1614   WBC  12.3*  8.2  10.4  8.7   --   8.1   HGB  12.0  11.8  11.8  13.1   --   13.2   HCT  34.1*  34.7  34.0*  37.7   --   38.9   PLT  270  272  250  302   --   287   HGBEXT   --    --    --    --   13.1   --    HCTEXT   --    --    --    --   37.7   --    PLTEXT   --    --    --    --   302   --        CMP:   Recent Labs      17   0935  06/14/17   1910  03/15/17   1101   NA  137   --   141   --    K  3.5   --   3.3*   --    CL  104   --   106   --    CO2  23   --   25   --    AGAP  10   --   10   --    GLU  89   --   88   --    BUN  6   --   6   --    CREA  0.54*  0.51*  0.56*  0.65   GFRAA  >60  156  >60  144   GFRNA  >60  135  >60  125   CA  8.7   --   9.0   --    ALB  2.7*  3.8  3.1*  4.7   TP  6.7  6.1  6.5  6.9   GLOB  4.0*   --   3.4   --    AGRAT  0.7*   --   0.9*   --    SGOT  26  15  15  15   ALT  31  13  16  10       Recent Labs      17   1215  17   0935  03/15/17   1101   URICA  4.1   --   4.6  3.7   LDH   --    --   193  147   PUQ   --   Unable to calculate.  Random urine protein result was less than 6.0 MG/DL.   --    --        Recent Glucose Results: Recent Glucose Results:   Recent Labs      17   GLU  89  88       Prenatal Labs:    Lab Results   Component Value Date/Time    Rubella, External Immune 03/15/2017    HBsAg, External Negative 03/15/2017    HIV, External Negative 03/15/2017    RPR, External Negative 03/15/2017         Assessment and Plan:    Patient Active Problem List    Diagnosis    Benign essential hypertension affecting pregnancy, antepartum     Labetalol 200mg BID on intake   Dx at age 16yo; reports renal imaging neg     Serial growth scans ever 3-4 weeks   testing by 34 weeks   Baseline HELLP labs wnl  Baseline 24hr urine:  90 (3/29/17)   Baseline EKG wnl (3/21/17)    ASA 81mg -->DC 36wks     5/15/17 (15wks) Increased to Lab 400 BID  but reports this made her nauseous so she changed to taking 200, waiting a few hours and taking the next 200mg  6/7/17: changed to Labetalol 200 TID.  7/19/2017-Inpatient [facility] Consult-Longstanding Chronic HTN, severe range BPs, added Procardia, BP well controlled now. Never had preeclamptic symptoms. · Labetalol-400 mgs tid. · Procardia 30 xl bid. · US for anatomy and growth at [facility] on Monday 7/24. · Note given to limit work to 6 hours per day.  Depression    Subclinical hyperthyroidism     No meds required      GBS bacteriuria     +GBS UTI-->Rx for PCN (3/18); neg JESSICA 4/10/17    If gets 2nd UTI will need ppx for rest of pregnancy  will need ppx in labor as well      Hyperprolactinemia ([facility] [address]    Supervision of high risk pregnancy     Quad screen neg       Allergic rhinitis           Signed By:  Jennifer Gonzales MD     July 19, 2017             Time: 110 Minutes spent on floor,with greater than 50% of the time examining patient, explaining plan and coordinating care with nurse and requesting primary physician.

## 2017-07-19 NOTE — PROGRESS NOTES
07/19/17 0958   Fetal Vital Signs   Mode External   Fetal Heart Rate 135   Fetal Activity Present   Variability 6-25 BPM   Decelerations Variable   Accelerations Yes  (10 x 10)   RN Reviewed Strip?  Yes   Non Stress Test (DSETINY FOR GA)   Uterine Activity   Mode External   Frequency (min) 0

## 2017-07-19 NOTE — DISCHARGE SUMMARY
Antepartum Discharge Summary     Name: Gregorio Lima MRN: [id number]  SSN: [id number]    YOB: 1990  Age: 27 y.o. Sex: female      Admit Date: 2017    Discharge Date: 2017     Admitting Physician: Lakhwinder Almeida MD     Attending Physician:  Shante Lares MD     * Admission Diagnoses: Benign essential HTN, chronic, antepartum, third trimester;*    * Discharge Diagnoses:   Hospital Problems as of 2017  Date Reviewed: 2017          Codes Class Noted - Resolved POA    * (Principal)Benign essential hypertension affecting pregnancy, antepartum ICD-10-CM: O10.019  ICD-9-CM: 642.03  2014 - Present Yes    Overview Addendum 2017  3:15 PM by Jennifer Gonzales MD     Labetalol 200mg BID on intake   Dx at age 14yo; reports renal imaging neg     Serial growth scans ever 3-4 weeks   testing by 34 weeks   Baseline HELLP labs wnl  Baseline 24hr urine:  90 (3/29/17)   Baseline EKG wnl (3/21/17)    ASA 81mg -->DC 36wks     5/15/17 (15wks) Increased to Lab 400 BID  but reports this made her nauseous so she changed to taking 200, waiting a few hours and taking the next 200mg  17: changed to Labetalol 200 TID.  2017-Inpatient [facility] Consult-Longstanding Chronic HTN, severe range BPs, added Procardia, BP well controlled now. Never had preeclamptic symptoms. · Labetalol-400 mgs tid. · Procardia 30 xl bid. · US for anatomy and growth at [facility] on . · Note given to limit work to 6 hours per day. RESOLVED: Benign essential HTN, chronic, antepartum ICD-10-CM: O10.019  ICD-9-CM: 642.03  2017 - 2017 Yes        RESOLVED: Preeclampsia ICD-10-CM: O14.90  ICD-9-CM: 642.40  2017 - 2017 No             Lab Results   Component Value Date/Time    Rubella, External Immune 03/15/2017    ABO,Rh O positive 03/15/2017      There is no immunization history on file for this patient.     * Discharge Condition: good    * Procedures: none  * No surgery found [facility] Course:    - Pregnancy-Induced Hypertension:                                - Patient admitted with elevated blood pressure. BP improved with bedrest, adjustment of her Labetalol dose and addition of Procardia 30 mg ER bid.     - Continue medications listed below. - Continue bedrest.    - Continue  testing. * Disposition: Home    Discharge Medications:   Current Discharge Medication List      START taking these medications    Details   NIFEdipine ER (PROCARDIA XL) 30 mg ER tablet Take 1 Tab by mouth every twelve (12) hours. Qty: 60 Tab, Refills: 2         CONTINUE these medications which have CHANGED    Details   labetalol (NORMODYNE) 200 mg tablet Take 2 Tabs by mouth every eight (8) hours. Qty: 180 Tab, Refills: 2         CONTINUE these medications which have NOT CHANGED    Details   -iron-FA-om-3s-dha-epa (VITAFOL GUMMIES) 3.33 mg iron- 0.33 mg chew Take 3 Tabs by mouth daily. Qty: 90 Tab, Refills: 11      aspirin delayed-release 81 mg tablet Take 1 Tab by mouth daily. Qty: 30 Tab, Refills: 11      doxylamine-pyridoxine (DICLEGIS) 10-10 mg TbEC Take 2 Tabs by mouth nightly. May add 1 tab in morning and 1 tab at noon if needed  Qty: 120 Tab, Refills: 5             * Follow-up Care/Patient Instructions:   Activity: BR with BRP  Diet: Regular Diet  Wound Care: None needed    Follow-up Information     Follow up With Details Comments [address]  [address]  [phone]             Signed By:  Rey Jiménez MD     2017

## 2017-07-19 NOTE — PROGRESS NOTES
SBAR report from [place] SUSHILA Lazaro. Pt resting in bed at this time. All needs met and call light within easy reach. Will continue to monitor.

## 2017-07-19 NOTE — PROGRESS NOTES
Assessment complete at this time per flow sheet. Vital signs stable. Patient denies pain, vaginal bleeding, leaking of fluid, epigastric pain, visual disturbance, headache and contractions. 24 hour urine in progress until 1215. See flowsheet for complete assessment. Patient states positive fetal movement. NST after breakfast. Encouraged to call as needed.

## 2017-07-19 NOTE — PROGRESS NOTES
High Risk Obstetrics Progress Note    Name: Bright Saba MRN: [id number]  SSN: [id number]    YOB: 1990  Age: 27 y.o. Sex: female      Subjective:  States she is doing well. Denies:  HA, visual changes and RUQ/epigastric pain. Feels \"funny\" for a minute after taking Procardia but it quickly resolves. + GFM. No VB.      LOS: 1 day    Estimated Date of Delivery: 10/26/17   Gestational Age Today: 23w6d     Patient admitted for chronic hypertension and pregnancy induced hypertension. States she does not have chest pain, headache , nausea and vomiting, right upper quadrant pain  , shortness of breath, vaginal bleeding  and visual disturbances. Objective:     Vitals:  Blood pressure (!) 153/92, pulse 90, temperature 98.2 °F (36.8 °C), resp. rate 16, height 5' 3\" (1.6 m), weight 165 lb (74.8 kg), last menstrual period 2017, SpO2 97 %, unknown if currently breastfeeding. Temp (24hrs), Av.2 °F (36.8 °C), Min:98.1 °F (36.7 °C), Max:98.2 °F (61.2 °C)    Systolic (94PGN), TSX:694 , Min:116 , BXJ:900      Diastolic (34GTJ), TVK:90, Min:63, Max:114       Intake and Output:       Date 17 0700 - 17 0659   Shift 6681-9072 3318-0816 6448-6314 24 Hour Total   I  N  T  A  K  E   P.O. 5986   5986    Shift Total  (mL/kg) 5986  (80)   5986  (80)   O  U  T  P  U  T   Urine  (mL/kg/hr) 2200  (3.7)   2200    Shift Total  (mL/kg) 2200  (29.4)   2200  (29.4)   Weight (kg) 74.8 74.8 74.8 74.8       Physical Exam:  Patient without distress. Fundus: soft and non tender  Lower Extremities:  - Edema No   - Patellar Reflexes: 1+ bilaterally   - Clonus: absent       Membranes:  Intact    Uterine Activity:  None    Fetal Heart Rate:  FHTs 130s.          Labs:   Recent Results (from the past 36 hour(s))   POC URINE DIPSTICK MANUAL    Collection Time: 17 11:20 AM   Result Value Ref Range    Protein (POC) Negative Negative    Glucose, urine (POC) Negative Negative    Ketones (POC) Negative Negative PROTEIN, URINE, 24 HR    Collection Time: 07/18/17 12:15 PM   Result Value Ref Range    Period of collection 24 hr    Volume 3300 mL    Protein, urine random <6 <11.9 mg/dL    Protein,urine 24 hr  mg/24hr     Unable to calculate. Random urine protein result was less than 6.0 MG/DL. URINALYSIS W/ RFLX MICROSCOPIC    Collection Time: 07/18/17  1:49 PM   Result Value Ref Range    Color YELLOW      Appearance CLOUDY      Specific gravity 1.002 1.001 - 1.023      pH (UA) 7.0 5.0 - 9.0      Protein NEGATIVE  NEG mg/dL    Glucose NEGATIVE  mg/dL    Ketone NEGATIVE  NEG mg/dL    Bilirubin NEGATIVE  NEG      Blood NEGATIVE  NEG      Urobilinogen 0.2 0.2 - 1.0 EU/dL    Nitrites NEGATIVE  NEG      Leukocyte Esterase NEGATIVE  NEG     CBC WITH AUTOMATED DIFF    Collection Time: 07/18/17  2:10 PM   Result Value Ref Range    WBC 12.3 (H) 4.3 - 11.1 K/uL    RBC 3.96 (L) 4.05 - 5.25 M/uL    HGB 12.0 11.7 - 15.4 g/dL    HCT 34.1 (L) 35.8 - 46.3 %    MCV 86.1 79.6 - 97.8 FL    MCH 30.3 26.1 - 32.9 PG    MCHC 35.2 (H) 31.4 - 35.0 g/dL    RDW 12.4 11.9 - 14.6 %    PLATELET 032 484 - 444 K/uL    MPV 10.3 (L) 10.8 - 14.1 FL    DF AUTOMATED      NEUTROPHILS 82 (H) 43 - 78 %    LYMPHOCYTES 12 (L) 13 - 44 %    MONOCYTES 5 4.0 - 12.0 %    EOSINOPHILS 1 0.5 - 7.8 %    BASOPHILS 0 0.0 - 2.0 %    IMMATURE GRANULOCYTES 0.3 0.0 - 5.0 %    ABS. NEUTROPHILS 10.0 (H) 1.7 - 8.2 K/UL    ABS. LYMPHOCYTES 1.5 0.5 - 4.6 K/UL    ABS. MONOCYTES 0.7 0.1 - 1.3 K/UL    ABS. EOSINOPHILS 0.1 0.0 - 0.8 K/UL    ABS. BASOPHILS 0.0 0.0 - 0.2 K/UL    ABS. IMM.  GRANS. 0.0 0.0 - 0.5 K/UL   METABOLIC PANEL, COMPREHENSIVE    Collection Time: 07/18/17  2:10 PM   Result Value Ref Range    Sodium 137 136 - 145 mmol/L    Potassium 3.5 3.5 - 5.1 mmol/L    Chloride 104 98 - 107 mmol/L    CO2 23 21 - 32 mmol/L    Anion gap 10 7 - 16 mmol/L    Glucose 89 65 - 100 mg/dL    BUN 6 6 - 23 MG/DL    Creatinine 0.54 (L) 0.6 - 1.0 MG/DL    GFR est AA >60 >60 ml/min/1.73m2    GFR est non-AA >60 >60 ml/min/1.73m2    Calcium 8.7 8.3 - 10.4 MG/DL    Bilirubin, total 0.4 0.2 - 1.1 MG/DL    ALT (SGPT) 31 12 - 65 U/L    AST (SGOT) 26 15 - 37 U/L    Alk. phosphatase 68 50 - 136 U/L    Protein, total 6.7 6.3 - 8.2 g/dL    Albumin 2.7 (L) 3.5 - 5.0 g/dL    Globulin 4.0 (H) 2.3 - 3.5 g/dL    A-G Ratio 0.7 (L) 1.2 - 3.5     URIC ACID    Collection Time: 07/18/17  2:10 PM   Result Value Ref Range    Uric acid 4.1 2.6 - 6.0 MG/DL   EKG, 12 LEAD, INITIAL    Collection Time: 07/18/17  8:57 PM   Result Value Ref Range    Ventricular Rate 88 BPM    Atrial Rate 88 BPM    P-R Interval 134 ms    QRS Duration 92 ms    Q-T Interval 376 ms    QTC Calculation (Bezet) 454 ms    Calculated P Axis 20 degrees    Calculated R Axis 60 degrees    Calculated T Axis 42 degrees    Diagnosis       Normal sinus rhythm with sinus arrhythmia  Normal ECG  When compared with ECG of 21-MAR-2017 09:00,  No significant change was found  Confirmed by ZACK APARICIO (), [place] ([id number]) on 7/19/2017 8:03:58 AM       Current Facility-Administered Medications   Medication Dose Route Frequency    labetalol (NORMODYNE) tablet 400 mg  400 mg Oral Q8H    NIFEdipine ER (PROCARDIA XL) tablet 30 mg  30 mg Oral Q12H    zolpidem (AMBIEN) tablet 5 mg  5 mg Oral QHS PRN         Assessment and Plan:    1. Chronic HTN w/ superimposed PIH. Nl labs, 24 hour urine \" too low to calculate\". D/w Dr. Jared Monson, advises discharge home, continue Labetalol 400 tid, Procardia XL bid & baby asa every day. Nimco Dumas PIH sx reviewed at length. Has f/u w/ Dr. Jared Monson next Monday. F/u at our office is scheduled for the week after. Advised BR w/ BRP. 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 Suggested she stay oow for the next 2 wks, if her f/u BPs look good we can slowly increase the amount of time she can be on her feet.     Importance of maternal close attention to her baby's movement pattern discussed. Advised pt to notify [facility] or come to the hospital if she ever has concern for decreased fetal movement.      -Nl 24 hour urine  - s/p Betamethasone x 2/24 hour.s        Principal Problem:    Benign essential hypertension affecting pregnancy, antepartum (2014)      Overview: Labetalol 200mg BID on intake       Dx at age 16yo; reports renal imaging neg             Serial growth scans ever 3-4 weeks       testing by 34 weeks       Baseline HELLP labs wnl      Baseline 24hr urine:  90 (3/29/17)       Baseline EKG wnl (3/21/17)        ASA 81mg -->DC 36wks             5/15/17 (15wks) Increased to Lab 400 BID  but reports this made her       nauseous so she changed to taking 200, waiting a few hours and taking the       next 200mg      17: changed to Labetalol 200 TID.      2017-Inpatient [facility] Consult-Longstanding Chronic HTN, severe range       BPs, added Procardia, BP well controlled now. Never had preeclamptic       symptoms. · Labetalol-400 mgs tid. · Procardia 30 xl bid. · US for anatomy and growth at [facility] CT on . · Note given to limit work to 6 hours per day. Chronic HTN w/ superimposed PIH.       Signed By: Samm De Luna MD     2017

## 2017-07-19 NOTE — DISCHARGE INSTRUCTIONS
Please follow up  at 9:15 at [facility]. Pregnancy Precautions: Care Instructions  Your Care Instructions  There is no sure way to prevent labor before your due date ( labor) or to prevent most other pregnancy problems. But there are things you can do to increase your chances of a healthy pregnancy. Go to your appointments, follow your doctor's advice, and take good care of yourself. Eat well, and exercise (if your doctor agrees). And make sure to drink plenty of water. Follow-up care is a key part of your treatment and safety. Be sure to make and go to all appointments, and call your doctor if you are having problems. It's also a good idea to know your test results and keep a list of the medicines you take. How can you care for yourself at home? · Make sure you go to your prenatal appointments. At each visit, your doctor will check your blood pressure. Your doctor will also check to see if you have protein in your urine. High blood pressure and protein in urine are signs of preeclampsia. This condition can be dangerous for you and your baby. · Drink plenty of fluids, enough so that your urine is light yellow or clear like water. Dehydration can cause contractions. If you have kidney, heart, or liver disease and have to limit fluids, talk with your doctor before you increase the amount of fluids you drink. · Tell your doctor right away if you notice any symptoms of an infection, such as:  ¨ Burning when you urinate. ¨ A foul-smelling discharge from your vagina. ¨ Vaginal itching. ¨ Unexplained fever. ¨ Unusual pain or soreness in your uterus or lower belly. · Eat a balanced diet. Include plenty of foods that are high in calcium and iron. ¨ Foods high in calcium include milk, cheese, yogurt, almonds, and broccoli. ¨ Foods high in iron include red meat, shellfish, poultry, eggs, beans, raisins, whole-grain bread, and leafy green vegetables. · Do not smoke. If you need help quitting, talk to your doctor about stop-smoking programs and medicines. These can increase your chances of quitting for good. · Do not drink alcohol or use illegal drugs. · Follow your doctor's directions about activity. Your doctor will let you know how much, if any, exercise you can do. · Ask your doctor if you can have sex. If you are at risk for early labor, your doctor may ask you to not have sex. · Take care to prevent falls. During pregnancy, your joints are loose, and your balance is off. Sports such as bicycling, skiing, or in-line skating can increase your risk of falling. And don't ride horses or motorcycles, dive, water ski, scuba dive, or parachute jump while you are pregnant. · Avoid getting very hot. Do not use saunas or hot tubs. Avoid staying out in the sun in hot weather for long periods. Take acetaminophen (Tylenol) to lower a high fever. · Do not take any over-the-counter or herbal medicines or supplements without talking to your doctor or pharmacist first.  When should you call for help? Call 911 anytime you think you may need emergency care. For example, call if:  · You passed out (lost consciousness). · You have severe vaginal bleeding. · You have severe pain in your belly or pelvis. · You have had fluid gushing or leaking from your vagina and you know or think the umbilical cord is bulging into your vagina. If this happens, immediately get down on your knees so your rear end (buttocks) is higher than your head. This will decrease the pressure on the cord until help arrives. Call your doctor now or seek immediate medical care if:  · You have signs of preeclampsia, such as:  ¨ Sudden swelling of your face, hands, or feet. ¨ New vision problems (such as dimness or blurring). ¨ A severe headache. · You have any vaginal bleeding. · You have belly pain or cramping. · You have a fever. · You have had regular contractions (with or without pain) for an hour.  This means that you have 8 or more within 1 hour or 4 or more in 20 minutes after you change your position and drink fluids. · You have a sudden release of fluid from your vagina. · You have low back pain or pelvic pressure that does not go away. · You notice that your baby has stopped moving or is moving much less than normal.  Watch closely for changes in your health, and be sure to contact your doctor if you have any problems. Where can you learn more? Go to http://micha-mahi.info/. Enter [id number] in the search box to learn more about \"Pregnancy Precautions: Care Instructions. \"  Current as of: March 16, 2017  Content Version: 11.3  © 2914-5016 [employer]. Care instructions adapted under license by [employer] (which disclaims liability or warranty for this information). If you have questions about a medical condition or this instruction, always ask your healthcare professional. [place] [address] any warranty or liability for your use of this information. High Blood Pressure: Care Instructions  Your Care Instructions  If your blood pressure is usually above 140/90, you have high blood pressure, or hypertension. That means the top number is 140 or higher or the bottom number is 90 or higher, or both. Despite what a lot of people think, high blood pressure usually doesn't cause headaches or make you feel dizzy or lightheaded. It usually has no symptoms. But it does increase your risk for heart attack, stroke, and kidney or eye damage. The higher your blood pressure, the more your risk increases. Your doctor will give you a goal for your blood pressure. Your goal will be based on your health and your age. An example of a goal is to keep your blood pressure below 140/90. Lifestyle changes, such as eating healthy and being active, are always important to help lower blood pressure.  You might also take medicine to reach your blood pressure goal.  Follow-up care is a key part of your treatment and safety. Be sure to make and go to all appointments, and call your doctor if you are having problems. It's also a good idea to know your test results and keep a list of the medicines you take. How can you care for yourself at home? Medical treatment  · If you stop taking your medicine, your blood pressure will go back up. You may take one or more types of medicine to lower your blood pressure. Be safe with medicines. Take your medicine exactly as prescribed. Call your doctor if you think you are having a problem with your medicine. · Talk to your doctor before you start taking aspirin every day. Aspirin can help certain people lower their risk of a heart attack or stroke. But taking aspirin isn't right for everyone, because it can cause serious bleeding. · See your doctor regularly. You may need to see the doctor more often at first or until your blood pressure comes down. · If you are taking blood pressure medicine, talk to your doctor before you take decongestants or anti-inflammatory medicine, such as ibuprofen. Some of these medicines can raise blood pressure. · Learn how to check your blood pressure at home. Lifestyle changes  · Stay at a healthy weight. This is especially important if you put on weight around the waist. Losing even 10 pounds can help you lower your blood pressure. · If your doctor recommends it, get more exercise. Walking is a good choice. Bit by bit, increase the amount you walk every day. Try for at least 30 minutes on most days of the week. You also may want to swim, bike, or do other activities. · Avoid or limit alcohol. Talk to your doctor about whether you can drink any alcohol. · Try to limit how much sodium you eat to less than 2,300 milligrams (mg) a day. Your doctor may ask you to try to eat less than 1,500 mg a day.   · Eat plenty of fruits (such as bananas and oranges), vegetables, legumes, whole grains, and low-fat dairy products. · Lower the amount of saturated fat in your diet. Saturated fat is found in animal products such as milk, cheese, and meat. Limiting these foods may help you lose weight and also lower your risk for heart disease. · Do not smoke. Smoking increases your risk for heart attack and stroke. If you need help quitting, talk to your doctor about stop-smoking programs and medicines. These can increase your chances of quitting for good. When should you call for help? Call 911 anytime you think you may need emergency care. This may mean having symptoms that suggest that your blood pressure is causing a serious heart or blood vessel problem. Your blood pressure may be over 180/110. For example, call 911 if:  · You have symptoms of a heart attack. These may include:  ¨ Chest pain or pressure, or a strange feeling in the chest.  ¨ Sweating. ¨ Shortness of breath. ¨ Nausea or vomiting. ¨ Pain, pressure, or a strange feeling in the back, neck, jaw, or upper belly or in one or both shoulders or arms. ¨ Lightheadedness or sudden weakness. ¨ A fast or irregular heartbeat. · You have symptoms of a stroke. These may include:  ¨ Sudden numbness, tingling, weakness, or loss of movement in your face, arm, or leg, especially on only one side of your body. ¨ Sudden vision changes. ¨ Sudden trouble speaking. ¨ Sudden confusion or trouble understanding simple statements. ¨ Sudden problems with walking or balance. ¨ A sudden, severe headache that is different from past headaches. · You have severe back or belly pain. Do not wait until your blood pressure comes down on its own. Get help right away. Call your doctor now or seek immediate care if:  · Your blood pressure is much higher than normal (such as 180/110 or higher), but you don't have symptoms. · You think high blood pressure is causing symptoms, such as:  ¨ Severe headache. ¨ Blurry vision.   Watch closely for changes in your health, and be sure to contact your doctor if:  · Your blood pressure measures 140/90 or higher at least 2 times. That means the top number is 140 or higher or the bottom number is 90 or higher, or both. · You think you may be having side effects from your blood pressure medicine. · Your blood pressure is usually normal, but it goes above normal at least 2 times. Where can you learn more? Go to http://micha-mahi.info/. Enter G323 in the search box to learn more about \"High Blood Pressure: Care Instructions. \"  Current as of: August 8, 2016  Content Version: 11.3  © 4484-0236 [employer]. Care instructions adapted under license by [employer] (which disclaims liability or warranty for this information). If you have questions about a medical condition or this instruction, always ask your healthcare professional. [address] any warranty or liability for your use of this information.

## 2017-07-19 NOTE — PROGRESS NOTES
07/19/17 1238   Maternal Vital Signs   Pulse (Heart Rate) 88   Resp Rate 16   /73   MAP (Calculated) 98   BP 1 Method Automatic   BP 1 Location Left arm   BP Patient Position Sitting

## 2017-07-19 NOTE — PROGRESS NOTES
07/19/17 0736   Maternal Vital Signs   Temp 98.2 °F (36.8 °C)   Temp Source Oral   Pulse (Heart Rate) 89   Resp Rate 18   Level of Consciousness Alert   /76   MAP (Calculated) 94   BP 1 Method Automatic   BP 1 Location Left arm   BP Patient Position Sitting

## 2017-07-19 NOTE — PROGRESS NOTES
07/19/17 0600   Maternal Vital Signs   Pulse (Heart Rate) 83   Level of Consciousness Alert   /67   MAP (Calculated) 84   BP 1 Method Automatic   BP 1 Location Right arm   BP Patient Position Lying right side     Intake and output reviewed for shift (see flow sheet). Ice replenished for 24 hour urine. All needs met and call light within easy reach.

## 2017-07-24 PROBLEM — E07.9 THYROID DYSFUNCTION IN PREGNANCY IN SECOND TRIMESTER: Status: ACTIVE | Noted: 2017-03-18

## 2017-07-24 PROBLEM — O26.612 CHOLESTASIS DURING PREGNANCY IN SECOND TRIMESTER: Status: ACTIVE | Noted: 2017-07-24

## 2017-07-24 PROBLEM — O99.282 THYROID DYSFUNCTION IN PREGNANCY IN SECOND TRIMESTER: Status: ACTIVE | Noted: 2017-03-18

## 2017-07-24 PROBLEM — K83.1 CHOLESTASIS DURING PREGNANCY IN SECOND TRIMESTER: Status: ACTIVE | Noted: 2017-07-24

## 2017-08-04 PROBLEM — R73.09 ELEVATED GLUCOSE TOLERANCE TEST: Status: ACTIVE | Noted: 2017-08-04

## 2017-08-08 PROBLEM — O36.5990 POOR FETAL GROWTH, AFFECTING MANAGEMENT OF MOTHER, ANTEPARTUM CONDITION OR COMPLICATION: Status: ACTIVE | Noted: 2017-08-08

## 2017-08-08 PROBLEM — O99.810 ABNORMAL O'SULLIVAN GLUCOSE CHALLENGE TEST, ANTEPARTUM: Status: ACTIVE | Noted: 2017-08-04

## 2017-08-08 PROBLEM — O26.613 CHOLESTASIS DURING PREGNANCY IN THIRD TRIMESTER: Status: ACTIVE | Noted: 2017-07-24

## 2017-08-08 PROBLEM — O36.5190 PLACENTAL INSUFFICIENCY AFFECTING MANAGEMENT OF MOTHER: Status: ACTIVE | Noted: 2017-08-08

## 2018-03-13 PROBLEM — E07.9 THYROID DYSFUNCTION IN PREGNANCY IN SECOND TRIMESTER: Status: RESOLVED | Noted: 2017-03-18 | Resolved: 2018-03-13

## 2018-03-13 PROBLEM — O36.5990 POOR FETAL GROWTH, AFFECTING MANAGEMENT OF MOTHER, ANTEPARTUM CONDITION OR COMPLICATION: Status: RESOLVED | Noted: 2017-08-08 | Resolved: 2018-03-13

## 2018-03-13 PROBLEM — O09.90 SUPERVISION OF HIGH RISK PREGNANCY, ANTEPARTUM: Status: RESOLVED | Noted: 2017-05-19 | Resolved: 2018-03-13

## 2018-03-13 PROBLEM — O99.810 ABNORMAL O'SULLIVAN GLUCOSE CHALLENGE TEST, ANTEPARTUM: Status: RESOLVED | Noted: 2017-08-04 | Resolved: 2018-03-13

## 2018-03-13 PROBLEM — R82.71 GBS BACTERIURIA: Status: RESOLVED | Noted: 2017-03-18 | Resolved: 2018-03-13

## 2018-03-13 PROBLEM — K83.1 CHOLESTASIS DURING PREGNANCY IN THIRD TRIMESTER: Status: RESOLVED | Noted: 2017-07-24 | Resolved: 2018-03-13

## 2018-03-13 PROBLEM — O26.613 CHOLESTASIS DURING PREGNANCY IN THIRD TRIMESTER: Status: RESOLVED | Noted: 2017-07-24 | Resolved: 2018-03-13

## 2018-03-13 PROBLEM — O99.282 THYROID DYSFUNCTION IN PREGNANCY IN SECOND TRIMESTER: Status: RESOLVED | Noted: 2017-03-18 | Resolved: 2018-03-13

## 2018-03-13 PROBLEM — O36.5190 PLACENTAL INSUFFICIENCY AFFECTING MANAGEMENT OF MOTHER: Status: RESOLVED | Noted: 2017-08-08 | Resolved: 2018-03-13

## 2022-09-14 ENCOUNTER — TELEPHONE (OUTPATIENT)
Dept: FAMILY MEDICINE CLINIC | Facility: CLINIC | Age: 32
End: 2022-09-14

## 2022-09-14 NOTE — TELEPHONE ENCOUNTER
JUWAN on 9/14 at 1:07 pm to assist patient in making an appointment for her physical with Dr. Isabela Barakat. I also let her know we did have a few available physicals left with Dr. Rodolfo Johns before the end of the year.

## 2022-11-02 DIAGNOSIS — F41.1 GENERALIZED ANXIETY DISORDER: ICD-10-CM

## 2022-12-14 ENCOUNTER — OFFICE VISIT (OUTPATIENT)
Dept: FAMILY MEDICINE CLINIC | Facility: CLINIC | Age: 32
End: 2022-12-14
Payer: COMMERCIAL

## 2022-12-14 VITALS
SYSTOLIC BLOOD PRESSURE: 168 MMHG | HEART RATE: 92 BPM | HEIGHT: 62 IN | DIASTOLIC BLOOD PRESSURE: 104 MMHG | BODY MASS INDEX: 29.77 KG/M2 | TEMPERATURE: 98 F | OXYGEN SATURATION: 97 % | WEIGHT: 161.8 LBS

## 2022-12-14 DIAGNOSIS — F41.1 GENERALIZED ANXIETY DISORDER: ICD-10-CM

## 2022-12-14 DIAGNOSIS — I10 ESSENTIAL (PRIMARY) HYPERTENSION: Primary | ICD-10-CM

## 2022-12-14 LAB
BASOPHILS # BLD: 0 K/UL (ref 0–0.2)
BASOPHILS NFR BLD: 1 % (ref 0–2)
DIFFERENTIAL METHOD BLD: ABNORMAL
EOSINOPHIL # BLD: 0.1 K/UL (ref 0–0.8)
EOSINOPHIL NFR BLD: 2 % (ref 0.5–7.8)
ERYTHROCYTE [DISTWIDTH] IN BLOOD BY AUTOMATED COUNT: 17 % (ref 11.9–14.6)
HCT VFR BLD AUTO: 40.5 % (ref 35.8–46.3)
HGB BLD-MCNC: 12.7 G/DL (ref 11.7–15.4)
IMM GRANULOCYTES # BLD AUTO: 0 K/UL (ref 0–0.5)
IMM GRANULOCYTES NFR BLD AUTO: 0 % (ref 0–5)
LYMPHOCYTES # BLD: 1.6 K/UL (ref 0.5–4.6)
LYMPHOCYTES NFR BLD: 24 % (ref 13–44)
MCH RBC QN AUTO: 25.6 PG (ref 26.1–32.9)
MCHC RBC AUTO-ENTMCNC: 31.4 G/DL (ref 31.4–35)
MCV RBC AUTO: 81.5 FL (ref 82–102)
MONOCYTES # BLD: 0.6 K/UL (ref 0.1–1.3)
MONOCYTES NFR BLD: 9 % (ref 4–12)
NEUTS SEG # BLD: 4.3 K/UL (ref 1.7–8.2)
NEUTS SEG NFR BLD: 64 % (ref 43–78)
NRBC # BLD: 0 K/UL (ref 0–0.2)
PLATELET # BLD AUTO: 380 K/UL (ref 150–450)
PMV BLD AUTO: 10.1 FL (ref 9.4–12.3)
RBC # BLD AUTO: 4.97 M/UL (ref 4.05–5.2)
WBC # BLD AUTO: 6.7 K/UL (ref 4.3–11.1)

## 2022-12-14 RX ORDER — AMLODIPINE BESYLATE 10 MG/1
10 TABLET ORAL DAILY
Qty: 90 TABLET | Refills: 3 | Status: SHIPPED | OUTPATIENT
Start: 2022-12-14

## 2022-12-14 RX ORDER — METOPROLOL SUCCINATE 50 MG/1
50 TABLET, EXTENDED RELEASE ORAL DAILY
Qty: 90 TABLET | Refills: 3 | Status: SHIPPED | OUTPATIENT
Start: 2022-12-14

## 2022-12-14 ASSESSMENT — PATIENT HEALTH QUESTIONNAIRE - PHQ9
8. MOVING OR SPEAKING SO SLOWLY THAT OTHER PEOPLE COULD HAVE NOTICED. OR THE OPPOSITE, BEING SO FIGETY OR RESTLESS THAT YOU HAVE BEEN MOVING AROUND A LOT MORE THAN USUAL: 0
2. FEELING DOWN, DEPRESSED OR HOPELESS: 0
SUM OF ALL RESPONSES TO PHQ QUESTIONS 1-9: 1
1. LITTLE INTEREST OR PLEASURE IN DOING THINGS: 0
3. TROUBLE FALLING OR STAYING ASLEEP: 0
SUM OF ALL RESPONSES TO PHQ QUESTIONS 1-9: 1
SUM OF ALL RESPONSES TO PHQ9 QUESTIONS 1 & 2: 0
SUM OF ALL RESPONSES TO PHQ QUESTIONS 1-9: 1
9. THOUGHTS THAT YOU WOULD BE BETTER OFF DEAD, OR OF HURTING YOURSELF: 0
4. FEELING TIRED OR HAVING LITTLE ENERGY: 1
5. POOR APPETITE OR OVEREATING: 0
SUM OF ALL RESPONSES TO PHQ QUESTIONS 1-9: 1
6. FEELING BAD ABOUT YOURSELF - OR THAT YOU ARE A FAILURE OR HAVE LET YOURSELF OR YOUR FAMILY DOWN: 0
10. IF YOU CHECKED OFF ANY PROBLEMS, HOW DIFFICULT HAVE THESE PROBLEMS MADE IT FOR YOU TO DO YOUR WORK, TAKE CARE OF THINGS AT HOME, OR GET ALONG WITH OTHER PEOPLE: 0
7. TROUBLE CONCENTRATING ON THINGS, SUCH AS READING THE NEWSPAPER OR WATCHING TELEVISION: 0

## 2022-12-14 ASSESSMENT — ENCOUNTER SYMPTOMS
SHORTNESS OF BREATH: 0
CONSTIPATION: 0
DIARRHEA: 0
SINUS PAIN: 0
ABDOMINAL PAIN: 0
COUGH: 0
EYE DISCHARGE: 0
CHEST TIGHTNESS: 0

## 2022-12-14 NOTE — PROGRESS NOTES
Justin Esquivel is a 29 y.o. female who presents with   Chief Complaint   Patient presents with    Medication Refill    Hypertension    Depression       History of Present Illness    BP has been elevated home. No CP or SOB. No headaches or edema. No side effects with medications. Not exercising regularly or eating well. Has been smoking. Drinking a couple beers most nights. Mood overall doing well and anxiety controlled. Review of Systems  Review of Systems   Constitutional:  Negative for appetite change, fatigue and fever. HENT:  Negative for congestion, ear pain and sinus pain. Eyes:  Negative for discharge. Respiratory:  Negative for cough, chest tightness and shortness of breath. Cardiovascular:  Negative for chest pain, palpitations and leg swelling. Gastrointestinal:  Negative for abdominal pain, constipation and diarrhea. Genitourinary:  Negative for dysuria. Musculoskeletal:  Negative for joint swelling. Skin:  Negative for rash. Neurological:  Negative for headaches. Hematological:  Negative for adenopathy. Psychiatric/Behavioral:  Negative for dysphoric mood. The patient is not nervous/anxious. Medications  Current Outpatient Medications   Medication Sig Dispense Refill    Multiple Vitamin (MULTIVITAMIN ADULT PO) Take by mouth      BIOTIN PO Take by mouth      FOLIC ACID PO Take by mouth      sertraline (ZOLOFT) 50 MG tablet Take 1 tablet by mouth daily 30 tablet 1    amLODIPine (NORVASC) 10 MG tablet Take 1 tablet by mouth daily 90 tablet 3    esomeprazole (NEXIUM) 20 MG delayed release capsule Take 1 capsule by mouth every morning (before breakfast) 90 capsule 3    metoprolol succinate (TOPROL XL) 50 MG extended release tablet Take 1 tablet by mouth daily 90 tablet 3     No current facility-administered medications for this visit.         Past Medical History  Past Medical History:   Diagnosis Date    Abnormal O'Kramer glucose challenge test, antepartum 8/4/2017    elev glucola 142 (8/3), 3hr GTT ____     Allergic rhinitis 1/17/2014    Benign essential hypertension affecting pregnancy, antepartum 1/17/2014    Labetalol 200mg BID on intake  Dx at age 14yo; reports renal imaging neg   Baseline HELLP labs wnl; Baseline 24hr urine:  90 (3/29/17); Baseline EKG wnl (3/21/17)   ASA 81mg -->DC 36wks   5/15/17 (15wks) Increased to Lab 400 BID  but reports this made her nauseous so she changed to taking 200, waiting a few hours and taking the next 200mg 6/7/17: changed to Labetalol 200 TID. 7/19/2017-Inpatient     Cholestasis during pregnancy in second trimester 7/24/2017    Cholestasis during pregnancy in third trimester 7/24/2017 7/24/2017 at [facility]:  Pt states she has itching on hands and feet and taking benadryl up to 3 times a day Start on ursodiol 300mg tid if elevated bile acids. It can take up to 7 days to show relief of the pruritis. Continue antihistamines until itching resolved. Fetal testing twice weekly beginning at 32 weeks. Recommend delivery at 36-37 weeks, sooner if maternal or fetal condition worsens. 8/    Depression 1/17/2014    GBS bacteriuria 3/18/2017    +GBS UTI-->Rx for PCN (3/18); neg JIM 4/10/17  If gets 2nd UTI will need ppx for rest of pregnancy will need ppx in labor as well    HTN (hypertension) 1/17/2014    Hyperprolactinemia ([facility] [address] 4/14/2016    Nicotine vapor product user     Placental insufficiency affecting management of mother 8/8/2017 8/8/2017 [facility]-  AEDF in umbilical arteries; borderline MCA for brainsparing; normal DV    Poor fetal growth, affecting management of mother, antepartum condition or complication 8/7/4767    Preeclampsia 7/18/2017    Supervision of high risk pregnancy, antepartum 5/19/2017    Quad screen neg   7/24/2017 at [facility]: Normal anatomy and normal echo.   AC 42%, overall 43%, DAVE 16 cm 8/8/2017 at [facility]: overall 28%, ac 9%; AEDF in umbilical artery, ductus venosus WNL, MCA- borderline Will recommend evaluation and monitoring/steroids at [facility]. AC 9%, overall 28%, DAVE 13 cm, UA Dopplers Abnormal, no end diastolic flow, BPP 8/8    Thyroid dysfunction in pregnancy in second trimester 3/18/2017    No meds required   3/15/2017  Free T4 -- TSH 4.830 (H)     Unspecified hypothyroidism 2014       Surgical History  Past Surgical History:   Procedure Laterality Date    GYN  2017        WISDOM TOOTH EXTRACTION  2018          Family History  Family History   Problem Relation Age of Onset    No Known Problems Brother     Cancer Maternal Grandfather         lung cancer    Heart Disease Paternal Grandfather     Stroke Paternal Grandmother     Hypertension Mother     Cancer Father     GERD Father     Hypertension Father     Cancer Mother         Kidney    Hypertension Brother        Social History  Social History     Socioeconomic History    Marital status:      Spouse name: Not on file    Number of children: Not on file    Years of education: Not on file    Highest education level: Not on file   Occupational History    Not on file   Tobacco Use    Smoking status: Some Days     Packs/day: 0.20     Types: Cigarettes    Smokeless tobacco: Never   Vaping Use    Vaping Use: Never used   Substance and Sexual Activity    Alcohol use: No    Drug use: No    Sexual activity: Not on file   Other Topics Concern    Not on file   Social History Narrative    Not on file     Social Determinants of Health     Financial Resource Strain: Not on file   Food Insecurity: Not on file   Transportation Needs: Not on file   Physical Activity: Not on file   Stress: Not on file   Social Connections: Not on file   Intimate Partner Violence: Not on file   Housing Stability: Not on file       Social History     Tobacco Use   Smoking Status Some Days    Packs/day: 0.20    Types: Cigarettes   Smokeless Tobacco Never       Allergies  No Known Allergies    Vital Signs  Body mass index is 29.59 kg/m².   Vitals:    22 1014   BP: (!) ([address]) 20 MG delayed release capsule; Take 1 capsule by mouth every morning (before breakfast)  -     metoprolol succinate (TOPROL XL) 50 MG extended release tablet; Take 1 tablet by mouth daily    Add Toprol  Check labs  Quit smoking  Increase exercise  Recheck 1 mo  Monitor BP at home    On this date I have spent 30 minutes reviewing previous notes, test results and face to face with the patient discussing the diagnosis and importance of compliance with the treatment plan as well as documenting on the day of the visit.

## 2022-12-15 LAB
ALBUMIN SERPL-MCNC: 4.4 G/DL (ref 3.5–5)
ALBUMIN/GLOB SERPL: 1.3 {RATIO} (ref 0.4–1.6)
ALP SERPL-CCNC: 103 U/L (ref 50–136)
ALT SERPL-CCNC: 28 U/L (ref 12–65)
ANION GAP SERPL CALC-SCNC: 8 MMOL/L (ref 2–11)
APPEARANCE UR: CLEAR
AST SERPL-CCNC: 23 U/L (ref 15–37)
BACTERIA URNS QL MICRO: NEGATIVE /HPF
BILIRUB SERPL-MCNC: 0.4 MG/DL (ref 0.2–1.1)
BILIRUB UR QL: NEGATIVE
BUN SERPL-MCNC: 13 MG/DL (ref 6–23)
CALCIUM SERPL-MCNC: 9.6 MG/DL (ref 8.3–10.4)
CASTS URNS QL MICRO: ABNORMAL /LPF (ref 0–2)
CHLORIDE SERPL-SCNC: 103 MMOL/L (ref 101–110)
CHOLEST SERPL-MCNC: 200 MG/DL
CO2 SERPL-SCNC: 29 MMOL/L (ref 21–32)
COLOR UR: ABNORMAL
CREAT SERPL-MCNC: 0.8 MG/DL (ref 0.6–1)
EPI CELLS #/AREA URNS HPF: ABNORMAL /HPF (ref 0–5)
GLOBULIN SER CALC-MCNC: 3.5 G/DL (ref 2.8–4.5)
GLUCOSE SERPL-MCNC: 98 MG/DL (ref 65–100)
GLUCOSE UR STRIP.AUTO-MCNC: NEGATIVE MG/DL
HDLC SERPL-MCNC: 50 MG/DL (ref 40–60)
HDLC SERPL: 4 {RATIO}
HGB UR QL STRIP: ABNORMAL
KETONES UR QL STRIP.AUTO: NEGATIVE MG/DL
LDLC SERPL CALC-MCNC: 121.2 MG/DL
LEUKOCYTE ESTERASE UR QL STRIP.AUTO: NEGATIVE
MUCOUS THREADS URNS QL MICRO: 0 /LPF
NITRITE UR QL STRIP.AUTO: NEGATIVE
PH UR STRIP: 7 [PH] (ref 5–9)
POTASSIUM SERPL-SCNC: 3.6 MMOL/L (ref 3.5–5.1)
PROT SERPL-MCNC: 7.9 G/DL (ref 6.3–8.2)
PROT UR STRIP-MCNC: NEGATIVE MG/DL
RBC #/AREA URNS HPF: ABNORMAL /HPF (ref 0–5)
SODIUM SERPL-SCNC: 140 MMOL/L (ref 133–143)
SP GR UR REFRACTOMETRY: 1.01 (ref 1–1.02)
TRIGL SERPL-MCNC: 144 MG/DL (ref 35–150)
URINE CULTURE IF INDICATED: ABNORMAL
UROBILINOGEN UR QL STRIP.AUTO: 0.2 EU/DL (ref 0.2–1)
VLDLC SERPL CALC-MCNC: 28.8 MG/DL (ref 6–23)
WBC URNS QL MICRO: ABNORMAL /HPF (ref 0–4)

## 2023-02-02 ENCOUNTER — OFFICE VISIT (OUTPATIENT)
Dept: FAMILY MEDICINE CLINIC | Facility: CLINIC | Age: 33
End: 2023-02-02

## 2023-02-02 VITALS
HEART RATE: 85 BPM | BODY MASS INDEX: 30.36 KG/M2 | WEIGHT: 166 LBS | TEMPERATURE: 97.6 F | DIASTOLIC BLOOD PRESSURE: 88 MMHG | OXYGEN SATURATION: 98 % | SYSTOLIC BLOOD PRESSURE: 130 MMHG

## 2023-02-02 DIAGNOSIS — F51.01 PRIMARY INSOMNIA: ICD-10-CM

## 2023-02-02 DIAGNOSIS — I10 ESSENTIAL (PRIMARY) HYPERTENSION: Primary | ICD-10-CM

## 2023-02-02 RX ORDER — HYDROXYZINE HYDROCHLORIDE 25 MG/1
25 TABLET, FILM COATED ORAL NIGHTLY
Qty: 30 TABLET | Refills: 5 | Status: SHIPPED | OUTPATIENT
Start: 2023-02-02 | End: 2023-03-04

## 2023-02-02 SDOH — ECONOMIC STABILITY: FOOD INSECURITY: WITHIN THE PAST 12 MONTHS, THE FOOD YOU BOUGHT JUST DIDN'T LAST AND YOU DIDN'T HAVE MONEY TO GET MORE.: NEVER TRUE

## 2023-02-02 SDOH — ECONOMIC STABILITY: INCOME INSECURITY: HOW HARD IS IT FOR YOU TO PAY FOR THE VERY BASICS LIKE FOOD, HOUSING, MEDICAL CARE, AND HEATING?: NOT HARD AT ALL

## 2023-02-02 SDOH — ECONOMIC STABILITY: HOUSING INSECURITY
IN THE LAST 12 MONTHS, WAS THERE A TIME WHEN YOU DID NOT HAVE A STEADY PLACE TO SLEEP OR SLEPT IN A SHELTER (INCLUDING NOW)?: NO

## 2023-02-02 SDOH — ECONOMIC STABILITY: FOOD INSECURITY: WITHIN THE PAST 12 MONTHS, YOU WORRIED THAT YOUR FOOD WOULD RUN OUT BEFORE YOU GOT MONEY TO BUY MORE.: NEVER TRUE

## 2023-02-02 ASSESSMENT — PATIENT HEALTH QUESTIONNAIRE - PHQ9
3. TROUBLE FALLING OR STAYING ASLEEP: 0
5. POOR APPETITE OR OVEREATING: 0
SUM OF ALL RESPONSES TO PHQ QUESTIONS 1-9: 0
8. MOVING OR SPEAKING SO SLOWLY THAT OTHER PEOPLE COULD HAVE NOTICED. OR THE OPPOSITE, BEING SO FIGETY OR RESTLESS THAT YOU HAVE BEEN MOVING AROUND A LOT MORE THAN USUAL: 0
1. LITTLE INTEREST OR PLEASURE IN DOING THINGS: 0
7. TROUBLE CONCENTRATING ON THINGS, SUCH AS READING THE NEWSPAPER OR WATCHING TELEVISION: 0
9. THOUGHTS THAT YOU WOULD BE BETTER OFF DEAD, OR OF HURTING YOURSELF: 0
2. FEELING DOWN, DEPRESSED OR HOPELESS: 0
SUM OF ALL RESPONSES TO PHQ QUESTIONS 1-9: 0
SUM OF ALL RESPONSES TO PHQ QUESTIONS 1-9: 0
4. FEELING TIRED OR HAVING LITTLE ENERGY: 0
6. FEELING BAD ABOUT YOURSELF - OR THAT YOU ARE A FAILURE OR HAVE LET YOURSELF OR YOUR FAMILY DOWN: 0
10. IF YOU CHECKED OFF ANY PROBLEMS, HOW DIFFICULT HAVE THESE PROBLEMS MADE IT FOR YOU TO DO YOUR WORK, TAKE CARE OF THINGS AT HOME, OR GET ALONG WITH OTHER PEOPLE: 0
SUM OF ALL RESPONSES TO PHQ QUESTIONS 1-9: 0
SUM OF ALL RESPONSES TO PHQ9 QUESTIONS 1 & 2: 0

## 2023-02-02 ASSESSMENT — ENCOUNTER SYMPTOMS
SINUS PAIN: 0
DIARRHEA: 0
EYE DISCHARGE: 0
COUGH: 0
SHORTNESS OF BREATH: 0
ABDOMINAL PAIN: 0
CONSTIPATION: 0
CHEST TIGHTNESS: 0

## 2023-02-02 NOTE — PROGRESS NOTES
Eva Stapleton is a 29 y.o. female who presents with   Chief Complaint   Patient presents with    Hypertension     Added metoprolol at [address] [facility]. Does not check BP at home. Denies unusual, dizziness, edema. Insomnia     Has cut back on etoh from approx 7/week to 3/week. Now not sleeping well. Has tried Lunesta in past - caused bad taste in mouth       History of Present Illness  Zoloft doing well with anxiety. Not sleeping well. Mood doing well. BP has been doing well at home. No CP or SOB. No headaches or edema. No side effects with medications. Exercising regularly. Has decreased etoh. Review of Systems  Review of Systems   Constitutional:  Negative for appetite change, fatigue and fever. HENT:  Negative for congestion, ear pain and sinus pain. Eyes:  Negative for discharge. Respiratory:  Negative for cough, chest tightness and shortness of breath. Cardiovascular:  Negative for chest pain, palpitations and leg swelling. Gastrointestinal:  Negative for abdominal pain, constipation and diarrhea. Genitourinary:  Negative for dysuria. Musculoskeletal:  Negative for joint swelling. Skin:  Negative for rash. Neurological:  Negative for headaches. Hematological:  Negative for adenopathy. Psychiatric/Behavioral:  Positive for sleep disturbance. Negative for dysphoric mood (doing well). The patient is nervous/anxious (but controlled and anxiety not keeping up at night).        Medications  Current Outpatient Medications   Medication Sig Dispense Refill    hydrOXYzine HCl (ATARAX) 25 MG tablet Take 1 tablet by mouth nightly 30 tablet 5    sertraline (ZOLOFT) 50 MG tablet Take 1 tablet by mouth daily 90 tablet 3    Multiple Vitamin (MULTIVITAMIN ADULT PO) Take by mouth      BIOTIN PO Take by mouth      FOLIC ACID PO Take by mouth      amLODIPine (NORVASC) 10 MG tablet Take 1 tablet by mouth daily 90 tablet 3    esomeprazole (NEXIUM) 20 MG delayed release capsule Take 1 capsule by mouth every morning (before breakfast) 90 capsule 3    metoprolol succinate (TOPROL XL) 50 MG extended release tablet Take 1 tablet by mouth daily 90 tablet 3     No current facility-administered medications for this visit. Past Medical History  Past Medical History:   Diagnosis Date    Abnormal O'Kramer glucose challenge test, antepartum 8/4/2017    elev glucola 142 (8/3), 3hr GTT ____     Allergic rhinitis 1/17/2014    Benign essential hypertension affecting pregnancy, antepartum 1/17/2014    Labetalol 200mg BID on intake  Dx at age 14yo; reports renal imaging neg   Baseline HELLP labs wnl; Baseline 24hr urine:  90 (3/29/17); Baseline EKG wnl (3/21/17)   ASA 81mg -->DC 36wks   5/15/17 (15wks) Increased to Lab 400 BID  but reports this made her nauseous so she changed to taking 200, waiting a few hours and taking the next 200mg 6/7/17: changed to Labetalol 200 TID. 7/19/2017-Inpatient     Cholestasis during pregnancy in second trimester 7/24/2017    Cholestasis during pregnancy in third trimester 7/24/2017 7/24/2017 at [facility]:  Pt states she has itching on hands and feet and taking benadryl up to 3 times a day Start on ursodiol 300mg tid if elevated bile acids. It can take up to 7 days to show relief of the pruritis. Continue antihistamines until itching resolved. Fetal testing twice weekly beginning at 32 weeks. Recommend delivery at 36-37 weeks, sooner if maternal or fetal condition worsens.    8/    Depression 1/17/2014    GBS bacteriuria 3/18/2017    +GBS UTI-->Rx for PCN (3/18); neg JIM 4/10/17  If gets 2nd UTI will need ppx for rest of pregnancy will need ppx in labor as well    HTN (hypertension) 1/17/2014    Hyperprolactinemia ([facility] [address] 4/14/2016    Nicotine vapor product user     Placental insufficiency affecting management of mother 8/8/2017 8/8/2017 [facility]-  AEDF in umbilical arteries; borderline MCA for brainsparing; normal DV    Poor fetal growth, affecting management of mother, antepartum condition or complication 2017    Preeclampsia 2017    Supervision of high risk pregnancy, antepartum 2017    Quad screen neg   2017 at [facility]: Normal anatomy and normal echo.  AC 42%, overall 43%, DAVE 16 cm 2017 at [facility]: overall 28%, ac 9%; AEDF in umbilical artery, ductus venosus WNL, MCA- borderline Will recommend evaluation and monitoring/steroids at [facility].  AC 9%, overall 28%, DAVE 13 cm, UA Dopplers Abnormal, no end diastolic flow, BPP     Thyroid dysfunction in pregnancy in second trimester 3/18/2017    No meds required   3/15/2017  Free T4 -- TSH 4.830 (H)     Unspecified hypothyroidism 2014       Surgical History  Past Surgical History:   Procedure Laterality Date    GYN  2017        WISDOM TOOTH EXTRACTION  2018          Family History  Family History   Problem Relation Age of Onset    No Known Problems Brother     Cancer Maternal Grandfather         lung cancer    Heart Disease Paternal Grandfather     Stroke Paternal Grandmother     Hypertension Mother     Cancer Father     GERD Father     Hypertension Father     Cancer Mother         Kidney    Hypertension Brother        Social History  Social History     Socioeconomic History    Marital status:      Spouse name: Not on file    Number of children: Not on file    Years of education: Not on file    Highest education level: Not on file   Occupational History    Not on file   Tobacco Use    Smoking status: Some Days     Packs/day: 0.20     Types: Cigarettes    Smokeless tobacco: Never   Vaping Use    Vaping Use: Never used   Substance and Sexual Activity    Alcohol use: Yes     Alcohol/week: 3.0 standard drinks     Types: 3 Cans of beer per week    Drug use: No    Sexual activity: Not on file   Other Topics Concern    Not on file   Social History Narrative    Not on file     Social Determinants of Health     Financial Resource Strain: Low Risk     Difficulty of Paying Living Expenses: Not hard at all   Food  Insecurity: No Food Insecurity    Worried About Running Out of Food in the Last Year: Never true    Ran Out of Food in the Last Year: Never true   Transportation Needs: Unknown    Lack of Transportation (Medical): Not on file    Lack of Transportation (Non-Medical): No   Physical Activity: Not on file   Stress: Not on file   Social Connections: Not on file   Intimate Partner Violence: Not on file   Housing Stability: Unknown    Unable to Pay for Housing in the Last Year: Not on file    Number of Places Lived in the Last Year: Not on file    Unstable Housing in the Last Year: No       Social History     Tobacco Use   Smoking Status Some Days    Packs/day: 0.20    Types: Cigarettes   Smokeless Tobacco Never       Allergies  No Known Allergies    Vital Signs  Body mass index is 30.36 kg/m². Vitals:    02/02/23 1123 02/02/23 1149   BP: (!) 156/98 130/88   Pulse: 85    Temp: 97.6 °F (36.4 °C)    TempSrc: Temporal    SpO2: 98%    Weight: 166 lb (75.3 kg)        Physical Exam  Physical Exam  Vitals reviewed. Constitutional:       Appearance: Normal appearance. HENT:      Head: Normocephalic. Right Ear: Tympanic membrane normal.      Left Ear: Tympanic membrane normal.      Nose: No congestion. Mouth/Throat:      Pharynx: No oropharyngeal exudate or posterior oropharyngeal erythema. Eyes:      Extraocular Movements: Extraocular movements intact. Conjunctiva/sclera: Conjunctivae normal.   Cardiovascular:      Rate and Rhythm: Normal rate and regular rhythm. Heart sounds: Normal heart sounds. No murmur heard. Pulmonary:      Effort: Pulmonary effort is normal.      Breath sounds: Normal breath sounds. No wheezing. Musculoskeletal:         General: No tenderness. Right lower leg: No edema. Left lower leg: No edema. Lymphadenopathy:      Cervical: No cervical adenopathy. Skin:     General: Skin is warm and dry. Findings: No rash.    Neurological:      General: No focal deficit present. Mental Status: She is alert. Psychiatric:         Mood and Affect: Mood normal.         Thought Content: Thought content normal.        Assessment and Plan  Eve Baker was seen today for hypertension and insomnia. Diagnoses and all orders for this visit:    Essential (primary) hypertension  -     sertraline (ZOLOFT) 50 MG tablet; Take 1 tablet by mouth daily    Primary insomnia  -     hydrOXYzine HCl (ATARAX) 25 MG tablet; Take 1 tablet by mouth nightly    Add Atarax  Call if not improved  Diet/exercise  On this date I have spent 30 minutes reviewing previous notes, test results and face to face with the patient discussing the diagnosis and importance of compliance with the treatment plan as well as documenting on the day of the visit.

## 2023-07-06 ENCOUNTER — OFFICE VISIT (OUTPATIENT)
Dept: FAMILY MEDICINE CLINIC | Facility: CLINIC | Age: 33
End: 2023-07-06

## 2023-07-06 VITALS
DIASTOLIC BLOOD PRESSURE: 88 MMHG | WEIGHT: 173.2 LBS | HEIGHT: 62 IN | SYSTOLIC BLOOD PRESSURE: 138 MMHG | TEMPERATURE: 97.7 F | HEART RATE: 71 BPM | BODY MASS INDEX: 31.87 KG/M2 | OXYGEN SATURATION: 98 %

## 2023-07-06 DIAGNOSIS — I10 PRIMARY HYPERTENSION: ICD-10-CM

## 2023-07-06 DIAGNOSIS — D50.9 IRON DEFICIENCY ANEMIA, UNSPECIFIED IRON DEFICIENCY ANEMIA TYPE: Primary | ICD-10-CM

## 2023-07-06 DIAGNOSIS — G47.30 SLEEP APNEA, UNSPECIFIED TYPE: ICD-10-CM

## 2023-07-06 DIAGNOSIS — J06.9 VIRAL URI: ICD-10-CM

## 2023-07-06 DIAGNOSIS — K92.1 BLOOD IN STOOL: ICD-10-CM

## 2023-07-06 LAB — HEMOGLOBIN, POC: 9.3 G/DL

## 2023-07-06 RX ORDER — BENZONATATE 200 MG/1
200 CAPSULE ORAL 3 TIMES DAILY PRN
Qty: 20 CAPSULE | Refills: 0 | Status: SHIPPED | OUTPATIENT
Start: 2023-07-06 | End: 2023-07-13

## 2023-07-06 RX ORDER — FERROUS SULFATE TAB EC 324 MG (65 MG FE EQUIVALENT) 324 (65 FE) MG
324 TABLET DELAYED RESPONSE ORAL DAILY
COMMUNITY
Start: 2023-07-01

## 2023-07-06 ASSESSMENT — ENCOUNTER SYMPTOMS
CHEST TIGHTNESS: 0
COUGH: 1
SHORTNESS OF BREATH: 0
EYE DISCHARGE: 0
ABDOMINAL PAIN: 0
CONSTIPATION: 0
SINUS PAIN: 0
DIARRHEA: 0

## 2023-07-06 NOTE — PROGRESS NOTES
Brian Benedict is a 29 y.o. female who presents with   Chief Complaint   Patient presents with    Hypertension    Follow-Up from Hospital     Tremors, cold sweats, heart racing. Went to ER, admitted. Hgb 7.5 - had transfusion. At d/c hgb was 9.6/stable. Sees GI this afternoon. Cough     Productive, congestion x2 days       History of Present Illness  Pt with hemoglobin corin to 7.5. Had occult blood found in stool. Was 9.6 at discharge. Sees GI today. Stool dark now with Fe supplement. Had transfusion last Friday. Was admitted overnight. Stopped ETOH 2 weeks ago. Had apnea and bradycardia in hospital at night. Snores. Baseling Hgb 12.7 in December. Review of Systems  Review of Systems   Constitutional:  Negative for appetite change, fatigue and fever. HENT:  Negative for congestion, ear pain and sinus pain. Eyes:  Negative for discharge. Respiratory:  Positive for cough. Negative for chest tightness and shortness of breath. Cardiovascular:  Negative for chest pain, palpitations and leg swelling. Gastrointestinal:  Negative for abdominal pain, constipation and diarrhea. Genitourinary:  Negative for dysuria. Musculoskeletal:  Negative for joint swelling. Skin:  Negative for rash. Neurological:  Negative for headaches. Hematological:  Negative for adenopathy. Psychiatric/Behavioral:  Negative for dysphoric mood. The patient is not nervous/anxious.        Medications  Current Outpatient Medications   Medication Sig Dispense Refill    ferrous sulfate 324 (65 Fe) MG EC tablet Take 1 tablet by mouth daily      benzonatate (TESSALON) 200 MG capsule Take 1 capsule by mouth 3 times daily as needed for Cough 20 capsule 0    sertraline (ZOLOFT) 50 MG tablet Take 1 tablet by mouth daily 90 tablet 3    amLODIPine (NORVASC) 10 MG tablet Take 1 tablet by mouth daily 90 tablet 3    esomeprazole (NEXIUM) 20 MG delayed release capsule Take 1 capsule by mouth every morning (before breakfast)

## 2023-07-11 ENCOUNTER — TELEPHONE (OUTPATIENT)
Dept: FAMILY MEDICINE CLINIC | Facility: CLINIC | Age: 33
End: 2023-07-11

## 2023-07-11 ENCOUNTER — NURSE ONLY (OUTPATIENT)
Dept: FAMILY MEDICINE CLINIC | Facility: CLINIC | Age: 33
End: 2023-07-11
Payer: COMMERCIAL

## 2023-07-11 DIAGNOSIS — D50.9 IRON DEFICIENCY ANEMIA, UNSPECIFIED IRON DEFICIENCY ANEMIA TYPE: Primary | ICD-10-CM

## 2023-07-11 LAB
HEMOCCULT STL QL: POSITIVE
HEMOGLOBIN, POC: 7.4 G/DL
VALID INTERNAL CONTROL: NORMAL

## 2023-07-11 PROCEDURE — 82272 OCCULT BLD FECES 1-3 TESTS: CPT | Performed by: FAMILY MEDICINE

## 2023-07-11 PROCEDURE — 85018 HEMOGLOBIN: CPT | Performed by: FAMILY MEDICINE

## 2023-07-11 NOTE — TELEPHONE ENCOUNTER
Patient would like to come in today to get hemoglobin rechecked. She's not feeling terrible, but not great either. Can we schedule her? Nurse visit?

## 2023-07-25 ENCOUNTER — TELEPHONE (OUTPATIENT)
Dept: FAMILY MEDICINE CLINIC | Facility: CLINIC | Age: 33
End: 2023-07-25

## 2023-07-25 NOTE — TELEPHONE ENCOUNTER
Spoke with pt who states she is feeling better, felt \"off\" this morning but felt better after going home, eating and resting. Encouraged pt to call if sxs return. Pt voiced understanding.

## 2023-07-25 NOTE — TELEPHONE ENCOUNTER
Per pt's mother: Pt has gone to ER for low iron. She is currently having her monthly cycle and her period is very light compared to it is normally heavy. The hospital put her on iron supplement pills. Pt is about to run out and is very concerned. The iron deficiency causes her to feel week and pass out. No appts available anytime soon with Dr Marybeth Koch. Can pt be worked in please?

## 2023-08-01 ENCOUNTER — TELEPHONE (OUTPATIENT)
Dept: FAMILY MEDICINE CLINIC | Facility: CLINIC | Age: 33
End: 2023-08-01

## 2023-08-01 RX ORDER — FERROUS SULFATE TAB EC 324 MG (65 MG FE EQUIVALENT) 324 (65 FE) MG
324 TABLET DELAYED RESPONSE ORAL DAILY
Qty: 30 TABLET | Refills: 3 | Status: SHIPPED | OUTPATIENT
Start: 2023-08-01

## 2023-08-01 NOTE — TELEPHONE ENCOUNTER
Refill: ferrous sulfate   Dosage: 324 mg  Freq: once daily  [facility] [place], [place]    Pt states she was prescribed this at the hospital but did not realize she needed Dr Rogelio Carbajal to authorize her to continue taking.

## 2023-12-06 ENCOUNTER — TELEPHONE (OUTPATIENT)
Dept: FAMILY MEDICINE CLINIC | Facility: CLINIC | Age: 33
End: 2023-12-06

## 2023-12-06 RX ORDER — FERROUS SULFATE 324(65)MG
324 TABLET, DELAYED RELEASE (ENTERIC COATED) ORAL DAILY
Qty: 30 TABLET | Refills: 3 | Status: SHIPPED | OUTPATIENT
Start: 2023-12-06

## 2023-12-06 NOTE — TELEPHONE ENCOUNTER
Refill: Ferrous Sulfate  Dosage: 324 MG EC Tab  Freq: qd  To: [facility] [address], [place]     Pt needs a refill til her next appt on 12/27

## 2023-12-27 ENCOUNTER — OFFICE VISIT (OUTPATIENT)
Dept: FAMILY MEDICINE CLINIC | Facility: CLINIC | Age: 33
End: 2023-12-27

## 2023-12-27 VITALS
OXYGEN SATURATION: 99 % | HEART RATE: 73 BPM | HEIGHT: 62 IN | WEIGHT: 177 LBS | BODY MASS INDEX: 32.57 KG/M2 | DIASTOLIC BLOOD PRESSURE: 80 MMHG | TEMPERATURE: 97.2 F | SYSTOLIC BLOOD PRESSURE: 136 MMHG

## 2023-12-27 DIAGNOSIS — F33.41 RECURRENT MAJOR DEPRESSIVE DISORDER, IN PARTIAL REMISSION (HCC): ICD-10-CM

## 2023-12-27 DIAGNOSIS — I10 PRIMARY HYPERTENSION: Primary | ICD-10-CM

## 2023-12-27 DIAGNOSIS — D50.9 IRON DEFICIENCY ANEMIA, UNSPECIFIED IRON DEFICIENCY ANEMIA TYPE: ICD-10-CM

## 2023-12-27 LAB — HEMOGLOBIN, POC: 10.3 G/DL

## 2023-12-27 PROCEDURE — 3075F SYST BP GE 130 - 139MM HG: CPT | Performed by: FAMILY MEDICINE

## 2023-12-27 PROCEDURE — 99214 OFFICE O/P EST MOD 30 MIN: CPT | Performed by: FAMILY MEDICINE

## 2023-12-27 PROCEDURE — 3079F DIAST BP 80-89 MM HG: CPT | Performed by: FAMILY MEDICINE

## 2023-12-27 PROCEDURE — 85018 HEMOGLOBIN: CPT | Performed by: FAMILY MEDICINE

## 2023-12-27 RX ORDER — FERROUS SULFATE 324(65)MG
324 TABLET, DELAYED RELEASE (ENTERIC COATED) ORAL DAILY
Qty: 90 TABLET | Refills: 3 | Status: SHIPPED | OUTPATIENT
Start: 2023-12-27

## 2023-12-27 RX ORDER — BUPROPION HYDROCHLORIDE 150 MG/1
150 TABLET ORAL EVERY MORNING
Qty: 30 TABLET | Refills: 3 | Status: SHIPPED | OUTPATIENT
Start: 2023-12-27

## 2023-12-27 RX ORDER — METOPROLOL SUCCINATE 50 MG/1
50 TABLET, EXTENDED RELEASE ORAL DAILY
Qty: 90 TABLET | Refills: 3 | Status: SHIPPED | OUTPATIENT
Start: 2023-12-27

## 2023-12-27 RX ORDER — AMLODIPINE BESYLATE 10 MG/1
10 TABLET ORAL DAILY
Qty: 90 TABLET | Refills: 3 | Status: SHIPPED | OUTPATIENT
Start: 2023-12-27

## 2023-12-27 NOTE — PROGRESS NOTES
Arturo Hirsch is a 34 y.o. female who presents with   Chief Complaint   Patient presents with    Hypertension     Does not check BP at home. Denies unusual HAs, dizziness, edema. Anemia    Depression     Taking Zoloft, may need dosage adjustment. History of Present Illness    BP has been doing well at home. No CP or SOB. No headaches or edema. No side effects with medications. Exercising regularly. Hgb has been loqw. Taking Fe. Last Hgb improved from 9 to 10.8. No blood in stool or dark stool. No cp. Saw GI. Mood lower. Decreased Motivation. On Zoloft. Review of Systems  Review of Systems   Constitutional:  Negative for appetite change, fatigue and fever. HENT:  Negative for congestion, ear pain and sinus pain. Eyes:  Negative for discharge. Respiratory:  Negative for cough, chest tightness and shortness of breath. Cardiovascular:  Negative for chest pain, palpitations and leg swelling. Gastrointestinal:  Negative for abdominal pain, constipation and diarrhea. Genitourinary:  Negative for dysuria. Musculoskeletal:  Negative for joint swelling. Skin:  Negative for rash. Neurological:  Negative for headaches. Hematological:  Negative for adenopathy. Psychiatric/Behavioral:  Negative for dysphoric mood. The patient is not nervous/anxious.          Medications  Current Outpatient Medications   Medication Sig Dispense Refill    amLODIPine (NORVASC) 10 MG tablet Take 1 tablet by mouth daily 90 tablet 3    ferrous sulfate 324 (65 Fe) MG EC tablet Take 1 tablet by mouth daily 90 tablet 3    metoprolol succinate (TOPROL XL) 50 MG extended release tablet Take 1 tablet by mouth daily 90 tablet 3    sertraline (ZOLOFT) 50 MG tablet Take 1 tablet by mouth daily 90 tablet 3    buPROPion (WELLBUTRIN XL) 150 MG extended release tablet Take 1 tablet by mouth every morning 30 tablet 3    esomeprazole (NEXIUM) 20 MG delayed release capsule Take 1 capsule by mouth every morning

## 2024-03-13 ENCOUNTER — OFFICE VISIT (OUTPATIENT)
Dept: FAMILY MEDICINE CLINIC | Facility: CLINIC | Age: 34
End: 2024-03-13
Payer: COMMERCIAL

## 2024-03-13 VITALS
SYSTOLIC BLOOD PRESSURE: 128 MMHG | OXYGEN SATURATION: 99 % | BODY MASS INDEX: 32.68 KG/M2 | TEMPERATURE: 97 F | HEIGHT: 62 IN | WEIGHT: 177.6 LBS | HEART RATE: 66 BPM | DIASTOLIC BLOOD PRESSURE: 72 MMHG

## 2024-03-13 DIAGNOSIS — F33.41 RECURRENT MAJOR DEPRESSIVE DISORDER, IN PARTIAL REMISSION (HCC): ICD-10-CM

## 2024-03-13 DIAGNOSIS — I10 PRIMARY HYPERTENSION: ICD-10-CM

## 2024-03-13 DIAGNOSIS — D50.9 IRON DEFICIENCY ANEMIA, UNSPECIFIED IRON DEFICIENCY ANEMIA TYPE: ICD-10-CM

## 2024-03-13 DIAGNOSIS — R53.83 FATIGUE, UNSPECIFIED TYPE: ICD-10-CM

## 2024-03-13 DIAGNOSIS — G47.30 SLEEP APNEA, UNSPECIFIED TYPE: Primary | ICD-10-CM

## 2024-03-13 LAB
BASOPHILS # BLD: 0.1 K/UL (ref 0–0.2)
BASOPHILS NFR BLD: 1 % (ref 0–2)
DIFFERENTIAL METHOD BLD: ABNORMAL
EOSINOPHIL # BLD: 0.2 K/UL (ref 0–0.8)
EOSINOPHIL NFR BLD: 3 % (ref 0.5–7.8)
ERYTHROCYTE [DISTWIDTH] IN BLOOD BY AUTOMATED COUNT: 15.3 % (ref 11.9–14.6)
FERRITIN SERPL-MCNC: 5 NG/ML (ref 8–388)
HCT VFR BLD AUTO: 36.3 % (ref 35.8–46.3)
HGB BLD-MCNC: 11.1 G/DL (ref 11.7–15.4)
IMM GRANULOCYTES # BLD AUTO: 0 K/UL (ref 0–0.5)
IMM GRANULOCYTES NFR BLD AUTO: 0 % (ref 0–5)
LYMPHOCYTES # BLD: 2.9 K/UL (ref 0.5–4.6)
LYMPHOCYTES NFR BLD: 38 % (ref 13–44)
MCH RBC QN AUTO: 24 PG (ref 26.1–32.9)
MCHC RBC AUTO-ENTMCNC: 30.6 G/DL (ref 31.4–35)
MCV RBC AUTO: 78.4 FL (ref 82–102)
MONOCYTES # BLD: 0.8 K/UL (ref 0.1–1.3)
MONOCYTES NFR BLD: 10 % (ref 4–12)
NEUTS SEG # BLD: 3.8 K/UL (ref 1.7–8.2)
NEUTS SEG NFR BLD: 48 % (ref 43–78)
NRBC # BLD: 0 K/UL (ref 0–0.2)
PLATELET # BLD AUTO: 482 K/UL (ref 150–450)
PMV BLD AUTO: 10.1 FL (ref 9.4–12.3)
RBC # BLD AUTO: 4.63 M/UL (ref 4.05–5.2)
WBC # BLD AUTO: 7.8 K/UL (ref 4.3–11.1)

## 2024-03-13 PROCEDURE — 3074F SYST BP LT 130 MM HG: CPT | Performed by: FAMILY MEDICINE

## 2024-03-13 PROCEDURE — 3078F DIAST BP <80 MM HG: CPT | Performed by: FAMILY MEDICINE

## 2024-03-13 PROCEDURE — 99214 OFFICE O/P EST MOD 30 MIN: CPT | Performed by: FAMILY MEDICINE

## 2024-03-13 RX ORDER — BUPROPION HYDROCHLORIDE 300 MG/1
300 TABLET ORAL EVERY MORNING
Qty: 90 TABLET | Refills: 1 | Status: SHIPPED | OUTPATIENT
Start: 2024-03-13

## 2024-03-13 SDOH — ECONOMIC STABILITY: FOOD INSECURITY: WITHIN THE PAST 12 MONTHS, YOU WORRIED THAT YOUR FOOD WOULD RUN OUT BEFORE YOU GOT MONEY TO BUY MORE.: NEVER TRUE

## 2024-03-13 SDOH — ECONOMIC STABILITY: INCOME INSECURITY: HOW HARD IS IT FOR YOU TO PAY FOR THE VERY BASICS LIKE FOOD, HOUSING, MEDICAL CARE, AND HEATING?: NOT HARD AT ALL

## 2024-03-13 SDOH — ECONOMIC STABILITY: FOOD INSECURITY: WITHIN THE PAST 12 MONTHS, THE FOOD YOU BOUGHT JUST DIDN'T LAST AND YOU DIDN'T HAVE MONEY TO GET MORE.: NEVER TRUE

## 2024-03-13 ASSESSMENT — PATIENT HEALTH QUESTIONNAIRE - PHQ9
9. THOUGHTS THAT YOU WOULD BE BETTER OFF DEAD, OR OF HURTING YOURSELF: 0
6. FEELING BAD ABOUT YOURSELF - OR THAT YOU ARE A FAILURE OR HAVE LET YOURSELF OR YOUR FAMILY DOWN: 0
2. FEELING DOWN, DEPRESSED OR HOPELESS: 1
3. TROUBLE FALLING OR STAYING ASLEEP: 1
SUM OF ALL RESPONSES TO PHQ QUESTIONS 1-9: 5
SUM OF ALL RESPONSES TO PHQ QUESTIONS 1-9: 5
1. LITTLE INTEREST OR PLEASURE IN DOING THINGS: 1
SUM OF ALL RESPONSES TO PHQ QUESTIONS 1-9: 5
10. IF YOU CHECKED OFF ANY PROBLEMS, HOW DIFFICULT HAVE THESE PROBLEMS MADE IT FOR YOU TO DO YOUR WORK, TAKE CARE OF THINGS AT HOME, OR GET ALONG WITH OTHER PEOPLE: 1
7. TROUBLE CONCENTRATING ON THINGS, SUCH AS READING THE NEWSPAPER OR WATCHING TELEVISION: 1
5. POOR APPETITE OR OVEREATING: 0
4. FEELING TIRED OR HAVING LITTLE ENERGY: 1
8. MOVING OR SPEAKING SO SLOWLY THAT OTHER PEOPLE COULD HAVE NOTICED. OR THE OPPOSITE, BEING SO FIGETY OR RESTLESS THAT YOU HAVE BEEN MOVING AROUND A LOT MORE THAN USUAL: 0
SUM OF ALL RESPONSES TO PHQ9 QUESTIONS 1 & 2: 2
SUM OF ALL RESPONSES TO PHQ QUESTIONS 1-9: 5

## 2024-03-13 ASSESSMENT — ENCOUNTER SYMPTOMS
COUGH: 0
CONSTIPATION: 0
CHEST TIGHTNESS: 0
ABDOMINAL PAIN: 0
EYE DISCHARGE: 0
SHORTNESS OF BREATH: 0
DIARRHEA: 0
SINUS PAIN: 0

## 2024-03-13 NOTE — PROGRESS NOTES
Kenna Vázquez is a 33 y.o. female who presents with   Chief Complaint   Patient presents with    Hypertension     Does not check BP at home. Denies unusual HAs, dizziness, edema.    Anxiety     And depression. Fatigued. Would like to discuss dosage.       History of Present Illness    BP has been doing well at home.  No CP or SOB.  No headaches or edema.  No side effects with medications.  Exercising regularly. Anxiety controlled. Still depressed with decreased motivation.  Snoring.  Hx of anemia.  Periods had been lighter, but now crampy.      Review of Systems  Review of Systems   Constitutional:  Negative for appetite change, fatigue and fever.   HENT:  Negative for congestion, ear pain and sinus pain.    Eyes:  Negative for discharge.   Respiratory:  Negative for cough, chest tightness and shortness of breath.    Cardiovascular:  Negative for chest pain, palpitations and leg swelling.   Gastrointestinal:  Negative for abdominal pain, constipation and diarrhea.   Genitourinary:  Negative for dysuria.   Musculoskeletal:  Negative for joint swelling.   Skin:  Negative for rash.   Neurological:  Negative for headaches.   Hematological:  Negative for adenopathy.   Psychiatric/Behavioral:  Negative for dysphoric mood. The patient is not nervous/anxious.         Medications  Current Outpatient Medications   Medication Sig Dispense Refill    buPROPion (WELLBUTRIN XL) 300 MG extended release tablet Take 1 tablet by mouth every morning 90 tablet 1    amLODIPine (NORVASC) 10 MG tablet Take 1 tablet by mouth daily 90 tablet 3    ferrous sulfate 324 (65 Fe) MG EC tablet Take 1 tablet by mouth daily 90 tablet 3    metoprolol succinate (TOPROL XL) 50 MG extended release tablet Take 1 tablet by mouth daily 90 tablet 3    sertraline (ZOLOFT) 50 MG tablet Take 1 tablet by mouth daily 90 tablet 3    esomeprazole (NEXIUM) 20 MG delayed release capsule Take 1 capsule by mouth every morning (before breakfast) (Patient not

## 2024-03-14 DIAGNOSIS — D50.9 IRON DEFICIENCY ANEMIA, UNSPECIFIED IRON DEFICIENCY ANEMIA TYPE: Primary | ICD-10-CM

## 2024-09-24 ENCOUNTER — OFFICE VISIT (OUTPATIENT)
Dept: FAMILY MEDICINE CLINIC | Facility: CLINIC | Age: 34
End: 2024-09-24

## 2024-09-24 VITALS
DIASTOLIC BLOOD PRESSURE: 88 MMHG | BODY MASS INDEX: 33.54 KG/M2 | WEIGHT: 183.4 LBS | TEMPERATURE: 97.8 F | HEART RATE: 82 BPM | OXYGEN SATURATION: 98 % | SYSTOLIC BLOOD PRESSURE: 130 MMHG

## 2024-09-24 DIAGNOSIS — F33.41 RECURRENT MAJOR DEPRESSIVE DISORDER, IN PARTIAL REMISSION (HCC): ICD-10-CM

## 2024-09-24 DIAGNOSIS — J40 BRONCHITIS: Primary | ICD-10-CM

## 2024-09-24 DIAGNOSIS — D50.9 IRON DEFICIENCY ANEMIA, UNSPECIFIED IRON DEFICIENCY ANEMIA TYPE: ICD-10-CM

## 2024-09-24 DIAGNOSIS — I10 PRIMARY HYPERTENSION: ICD-10-CM

## 2024-09-24 LAB
BASOPHILS # BLD: 0.1 K/UL (ref 0–0.2)
BASOPHILS NFR BLD: 1 % (ref 0–2)
DIFFERENTIAL METHOD BLD: NORMAL
EOSINOPHIL # BLD: 0.2 K/UL (ref 0–0.8)
EOSINOPHIL NFR BLD: 2 % (ref 0.5–7.8)
ERYTHROCYTE [DISTWIDTH] IN BLOOD BY AUTOMATED COUNT: 12.3 % (ref 11.9–14.6)
HCT VFR BLD AUTO: 42.4 % (ref 35.8–46.3)
HGB BLD-MCNC: 14.3 G/DL (ref 11.7–15.4)
IMM GRANULOCYTES # BLD AUTO: 0 K/UL (ref 0–0.5)
IMM GRANULOCYTES NFR BLD AUTO: 0 % (ref 0–5)
LYMPHOCYTES # BLD: 2.1 K/UL (ref 0.5–4.6)
LYMPHOCYTES NFR BLD: 19 % (ref 13–44)
MCH RBC QN AUTO: 30.8 PG (ref 26.1–32.9)
MCHC RBC AUTO-ENTMCNC: 33.7 G/DL (ref 31.4–35)
MCV RBC AUTO: 91.4 FL (ref 82–102)
MONOCYTES # BLD: 0.8 K/UL (ref 0.1–1.3)
MONOCYTES NFR BLD: 7 % (ref 4–12)
NEUTS SEG # BLD: 7.9 K/UL (ref 1.7–8.2)
NEUTS SEG NFR BLD: 71 % (ref 43–78)
NRBC # BLD: 0 K/UL (ref 0–0.2)
PLATELET # BLD AUTO: 349 K/UL (ref 150–450)
PMV BLD AUTO: 10.3 FL (ref 9.4–12.3)
RBC # BLD AUTO: 4.64 M/UL (ref 4.05–5.2)
WBC # BLD AUTO: 11.1 K/UL (ref 4.3–11.1)

## 2024-09-24 PROCEDURE — 3075F SYST BP GE 130 - 139MM HG: CPT | Performed by: FAMILY MEDICINE

## 2024-09-24 PROCEDURE — 99214 OFFICE O/P EST MOD 30 MIN: CPT | Performed by: FAMILY MEDICINE

## 2024-09-24 PROCEDURE — 3079F DIAST BP 80-89 MM HG: CPT | Performed by: FAMILY MEDICINE

## 2024-09-24 RX ORDER — DOXYCYCLINE 100 MG/1
100 CAPSULE ORAL 2 TIMES DAILY
Qty: 20 CAPSULE | Refills: 0 | Status: SHIPPED | OUTPATIENT
Start: 2024-09-24 | End: 2024-10-04

## 2024-09-24 RX ORDER — FERROUS SULFATE 324(65)MG
324 TABLET, DELAYED RELEASE (ENTERIC COATED) ORAL DAILY
Qty: 90 TABLET | Refills: 3 | Status: SHIPPED | OUTPATIENT
Start: 2024-09-24

## 2024-09-24 RX ORDER — BUPROPION HYDROCHLORIDE 300 MG/1
300 TABLET ORAL EVERY MORNING
Qty: 90 TABLET | Refills: 1 | Status: SHIPPED | OUTPATIENT
Start: 2024-09-24

## 2024-09-24 RX ORDER — AMLODIPINE BESYLATE 10 MG/1
10 TABLET ORAL DAILY
Qty: 90 TABLET | Refills: 3 | Status: SHIPPED | OUTPATIENT
Start: 2024-09-24

## 2024-09-24 RX ORDER — METHYLPREDNISOLONE 4 MG
TABLET, DOSE PACK ORAL
Qty: 1 KIT | Refills: 0 | Status: SHIPPED | OUTPATIENT
Start: 2024-09-24

## 2024-09-24 RX ORDER — METOPROLOL SUCCINATE 50 MG/1
50 TABLET, EXTENDED RELEASE ORAL DAILY
Qty: 90 TABLET | Refills: 3 | Status: SHIPPED | OUTPATIENT
Start: 2024-09-24

## 2024-09-24 ASSESSMENT — ENCOUNTER SYMPTOMS
EYE DISCHARGE: 0
ABDOMINAL PAIN: 0
CHEST TIGHTNESS: 0
SHORTNESS OF BREATH: 0
WHEEZING: 1
SINUS PAIN: 0
COUGH: 1
CONSTIPATION: 0
DIARRHEA: 0

## 2024-09-25 LAB — FERRITIN SERPL-MCNC: 102 NG/ML (ref 8–388)

## 2024-11-20 ENCOUNTER — TELEPHONE (OUTPATIENT)
Dept: FAMILY MEDICINE CLINIC | Facility: CLINIC | Age: 34
End: 2024-11-20

## 2024-11-20 NOTE — TELEPHONE ENCOUNTER
Patient thinks she's pregnant and wants to come in for blood test. Does she need an office visit or just labs? Please advise.

## 2024-11-22 ENCOUNTER — TELEMEDICINE (OUTPATIENT)
Dept: FAMILY MEDICINE CLINIC | Facility: CLINIC | Age: 34
End: 2024-11-22
Payer: COMMERCIAL

## 2024-11-22 DIAGNOSIS — Z34.90 PREGNANCY, UNSPECIFIED GESTATIONAL AGE: ICD-10-CM

## 2024-11-22 DIAGNOSIS — I10 PRIMARY HYPERTENSION: Primary | ICD-10-CM

## 2024-11-22 DIAGNOSIS — F33.41 RECURRENT MAJOR DEPRESSIVE DISORDER, IN PARTIAL REMISSION (HCC): ICD-10-CM

## 2024-11-22 PROCEDURE — 99214 OFFICE O/P EST MOD 30 MIN: CPT | Performed by: FAMILY MEDICINE

## 2024-11-22 PROCEDURE — G8428 CUR MEDS NOT DOCUMENT: HCPCS | Performed by: FAMILY MEDICINE

## 2024-11-22 RX ORDER — LABETALOL 200 MG/1
200 TABLET, FILM COATED ORAL 2 TIMES DAILY
Qty: 60 TABLET | Refills: 5 | Status: SHIPPED | OUTPATIENT
Start: 2024-11-22

## 2024-11-22 ASSESSMENT — ENCOUNTER SYMPTOMS
DIARRHEA: 0
SINUS PAIN: 0
ABDOMINAL PAIN: 0
EYE DISCHARGE: 0
CONSTIPATION: 0
SHORTNESS OF BREATH: 0
COUGH: 0
CHEST TIGHTNESS: 0

## 2024-11-22 NOTE — PROGRESS NOTES
observation)    Blood pressure-  Heart rate-    Respiratory rate-    Temperature-  Pulse oximetry-     Constitutional: [x] Appears well-developed and well-nourished [] No apparent distress      [] Abnormal-   Mental status  [x] Alert and awake  [] Oriented to person/place/time []Able to follow commands      Eyes:  EOM    [x]  Normal  [] Abnormal-  Sclera  []  Normal  [] Abnormal -         Discharge []  None visible  [] Abnormal -    HENT:   [x] Normocephalic, atraumatic.  [] Abnormal   [] Mouth/Throat: Mucous membranes are moist.     External Ears [x] Normal  [] Abnormal-     Neck: [x] No visualized mass     Pulmonary/Chest: [x] Respiratory effort normal.  [] No visualized signs of difficulty breathing or respiratory distress        [] Abnormal-      Musculoskeletal:   [] Normal gait with no signs of ataxia         [] Normal range of motion of neck        [] Abnormal-       Neurological:        [x] No Facial Asymmetry (Cranial nerve 7 motor function) (limited exam to video visit)          [] No gaze palsy        [] Abnormal-         Skin:        [x] No significant exanthematous lesions or discoloration noted on facial skin         [] Abnormal-            Psychiatric:       [x] Normal Affect [] No Hallucinations        [] Abnormal-     Other pertinent observable physical exam findings-     ASSESSMENT/PLAN:  1. Primary hypertension    - labetalol (NORMODYNE) 200 MG tablet; Take 1 tablet by mouth 2 times daily  Dispense: 60 tablet; Refill: 5  Stop Toprol; Hold Norvasc, but can add if BP increases over 140/90  2. Recurrent major depressive disorder, in partial remission (HCC)  Stop Wellbutrin and cont Zoloft; discuss with OB at visit    3. Pregnancy, unspecified gestational age    HCG low for dates, but irregular periods; f/u with OB and report pain, bleeding  Stop Wellbutrin and cont Zoloft    Return if symptoms worsen or fail to improve and with OB.    Kenna Vázquez, was evaluated through a synchronous

## 2025-01-21 ENCOUNTER — TELEPHONE (OUTPATIENT)
Dept: FAMILY MEDICINE CLINIC | Facility: CLINIC | Age: 35
End: 2025-01-21

## 2025-01-21 DIAGNOSIS — I10 PRIMARY HYPERTENSION: ICD-10-CM

## 2025-01-21 RX ORDER — METOPROLOL SUCCINATE 50 MG/1
50 TABLET, EXTENDED RELEASE ORAL DAILY
Qty: 90 TABLET | Refills: 3 | Status: SHIPPED | OUTPATIENT
Start: 2025-01-21

## 2025-01-21 NOTE — TELEPHONE ENCOUNTER
Pt has question about meds; dr obrien changed her prescripton because of her pregnancy but she had a miscarriage. Pt needs her medication switched back to what it was before her pregnancy.     [facility] [place]